# Patient Record
Sex: FEMALE | Race: WHITE | Employment: UNEMPLOYED | URBAN - METROPOLITAN AREA
[De-identification: names, ages, dates, MRNs, and addresses within clinical notes are randomized per-mention and may not be internally consistent; named-entity substitution may affect disease eponyms.]

---

## 2018-11-09 ENCOUNTER — HOSPITAL ENCOUNTER (OUTPATIENT)
Dept: LAB | Age: 38
Discharge: HOME | End: 2018-11-09

## 2018-11-09 LAB
HCT VFR BLD AUTO: 37.1 % (ref 35–45)
HGB BLD-MCNC: 12.4 G/DL (ref 12–16)

## 2018-11-09 PROCEDURE — 36415 COLL VENOUS BLD VENIPUNCTURE: CPT

## 2018-11-09 PROCEDURE — 85018 HEMOGLOBIN: CPT

## 2018-11-11 ENCOUNTER — HOSPITAL ENCOUNTER (EMERGENCY)
Age: 38
Discharge: STILL ADMITTED OR ADMINISTRATIVE | DRG: 228 | End: 2018-11-11
Attending: EMERGENCY MEDICINE
Payer: MEDICAID

## 2018-11-11 ENCOUNTER — HOSPITAL ENCOUNTER (INPATIENT)
Age: 38
LOS: 3 days | Discharge: HOME | DRG: 228 | End: 2018-11-14
Attending: SURGERY | Admitting: SURGERY
Payer: MEDICAID

## 2018-11-11 VITALS
HEART RATE: 78 BPM | HEIGHT: 64 IN | OXYGEN SATURATION: 100 % | DIASTOLIC BLOOD PRESSURE: 73 MMHG | WEIGHT: 124 LBS | SYSTOLIC BLOOD PRESSURE: 99 MMHG | TEMPERATURE: 98.5 F | BODY MASS INDEX: 21.17 KG/M2 | RESPIRATION RATE: 18 BRPM

## 2018-11-11 DIAGNOSIS — K40.30 INCARCERATED INGUINAL HERNIA: Primary | ICD-10-CM

## 2018-11-11 LAB
ALBUMIN SERPL-MCNC: 3.9 G/DL (ref 3.4–5)
ALBUMIN/GLOB SERPL: 1.2 {RATIO} (ref 0.8–1.7)
ALP SERPL-CCNC: 50 U/L (ref 45–117)
ALT SERPL-CCNC: 16 U/L (ref 13–56)
ANION GAP SERPL CALC-SCNC: 9 MMOL/L (ref 3–18)
APPEARANCE UR: NORMAL
AST SERPL-CCNC: 10 U/L (ref 15–37)
BASOPHILS # BLD: 0 K/UL (ref 0–0.1)
BASOPHILS NFR BLD: 0 % (ref 0–2)
BILIRUB SERPL-MCNC: 0.2 MG/DL (ref 0.2–1)
BILIRUB UR QL: NEGATIVE
BUN SERPL-MCNC: 14 MG/DL (ref 7–18)
BUN/CREAT SERPL: 19
CALCIUM SERPL-MCNC: 8.3 MG/DL (ref 8.5–10.1)
CHLORIDE SERPL-SCNC: 111 MMOL/L (ref 100–108)
CO2 SERPL-SCNC: 23 MMOL/L (ref 21–32)
COLOR UR: YELLOW
CREAT SERPL-MCNC: 0.74 MG/DL (ref 0.6–1.3)
DIFFERENTIAL METHOD BLD: ABNORMAL
EOSINOPHIL # BLD: 0.2 K/UL (ref 0–0.4)
EOSINOPHIL NFR BLD: 2 % (ref 0–5)
ERYTHROCYTE [DISTWIDTH] IN BLOOD BY AUTOMATED COUNT: 13 % (ref 11.6–14.5)
GLOBULIN SER CALC-MCNC: 3.3 G/DL (ref 2–4)
GLUCOSE SERPL-MCNC: 82 MG/DL (ref 74–99)
GLUCOSE UR STRIP.AUTO-MCNC: NEGATIVE MG/DL
HCT VFR BLD AUTO: 38.2 % (ref 35–45)
HGB BLD-MCNC: 13 G/DL (ref 12–16)
HGB UR QL STRIP: NEGATIVE
KETONES UR QL STRIP.AUTO: NEGATIVE MG/DL
LEUKOCYTE ESTERASE UR QL STRIP.AUTO: NEGATIVE
LYMPHOCYTES # BLD: 3 K/UL (ref 0.9–3.6)
LYMPHOCYTES NFR BLD: 37 % (ref 21–52)
MCH RBC QN AUTO: 30.9 PG (ref 24–34)
MCHC RBC AUTO-ENTMCNC: 34 G/DL (ref 31–37)
MCV RBC AUTO: 90.7 FL (ref 74–97)
MONOCYTES # BLD: 0.5 K/UL (ref 0.05–1.2)
MONOCYTES NFR BLD: 6 % (ref 3–10)
NEUTS SEG # BLD: 4.5 K/UL (ref 1.8–8)
NEUTS SEG NFR BLD: 55 % (ref 40–73)
NITRITE UR QL STRIP.AUTO: NEGATIVE
PH UR STRIP: 7.5 [PH] (ref 5–8)
PLATELET # BLD AUTO: 191 K/UL (ref 135–420)
PMV BLD AUTO: 9 FL (ref 9.2–11.8)
POTASSIUM SERPL-SCNC: 3.7 MMOL/L (ref 3.5–5.5)
PROT SERPL-MCNC: 7.2 G/DL (ref 6.4–8.2)
PROT UR STRIP-MCNC: NEGATIVE MG/DL
RBC # BLD AUTO: 4.21 M/UL (ref 4.2–5.3)
SODIUM SERPL-SCNC: 143 MMOL/L (ref 136–145)
SP GR UR REFRACTOMETRY: 1.02 (ref 1–1.03)
UROBILINOGEN UR QL STRIP.AUTO: 1 EU/DL (ref 0.2–1)
WBC # BLD AUTO: 8.2 K/UL (ref 4.6–13.2)

## 2018-11-11 PROCEDURE — 81003 URINALYSIS AUTO W/O SCOPE: CPT

## 2018-11-11 PROCEDURE — 80053 COMPREHEN METABOLIC PANEL: CPT

## 2018-11-11 PROCEDURE — 85025 COMPLETE CBC W/AUTO DIFF WBC: CPT

## 2018-11-11 PROCEDURE — 65270000029 HC RM PRIVATE

## 2018-11-11 PROCEDURE — 99283 EMERGENCY DEPT VISIT LOW MDM: CPT

## 2018-11-11 PROCEDURE — 74011000258 HC RX REV CODE- 258: Performed by: SURGERY

## 2018-11-11 PROCEDURE — 74011250636 HC RX REV CODE- 250/636: Performed by: SURGERY

## 2018-11-11 RX ORDER — OXYCODONE AND ACETAMINOPHEN 5; 325 MG/1; MG/1
1 TABLET ORAL
Status: DISCONTINUED | OUTPATIENT
Start: 2018-11-11 | End: 2018-11-13

## 2018-11-11 RX ORDER — SODIUM CHLORIDE 0.9 % (FLUSH) 0.9 %
5-10 SYRINGE (ML) INJECTION EVERY 8 HOURS
Status: DISCONTINUED | OUTPATIENT
Start: 2018-11-11 | End: 2018-11-14 | Stop reason: HOSPADM

## 2018-11-11 RX ORDER — DEXTROSE MONOHYDRATE AND SODIUM CHLORIDE 5; .45 G/100ML; G/100ML
150 INJECTION, SOLUTION INTRAVENOUS CONTINUOUS
Status: DISCONTINUED | OUTPATIENT
Start: 2018-11-11 | End: 2018-11-12

## 2018-11-11 RX ORDER — SODIUM CHLORIDE 0.9 % (FLUSH) 0.9 %
5-10 SYRINGE (ML) INJECTION AS NEEDED
Status: DISCONTINUED | OUTPATIENT
Start: 2018-11-11 | End: 2018-11-14 | Stop reason: HOSPADM

## 2018-11-11 RX ORDER — HYDROMORPHONE HYDROCHLORIDE 2 MG/ML
1 INJECTION, SOLUTION INTRAMUSCULAR; INTRAVENOUS; SUBCUTANEOUS
Status: DISCONTINUED | OUTPATIENT
Start: 2018-11-11 | End: 2018-11-13

## 2018-11-11 RX ORDER — KETOROLAC TROMETHAMINE 30 MG/ML
30 INJECTION, SOLUTION INTRAMUSCULAR; INTRAVENOUS
Status: DISPENSED | OUTPATIENT
Start: 2018-11-11 | End: 2018-11-12

## 2018-11-11 RX ORDER — TOPIRAMATE 100 MG/1
200 TABLET, FILM COATED ORAL 2 TIMES DAILY
Status: DISCONTINUED | OUTPATIENT
Start: 2018-11-12 | End: 2018-11-14 | Stop reason: HOSPADM

## 2018-11-11 RX ORDER — ONDANSETRON 2 MG/ML
4 INJECTION INTRAMUSCULAR; INTRAVENOUS
Status: DISCONTINUED | OUTPATIENT
Start: 2018-11-11 | End: 2018-11-14 | Stop reason: HOSPADM

## 2018-11-11 RX ADMIN — DEXTROSE MONOHYDRATE AND SODIUM CHLORIDE 125 ML/HR: 5; .45 INJECTION, SOLUTION INTRAVENOUS at 23:04

## 2018-11-11 RX ADMIN — ONDANSETRON 4 MG: 2 INJECTION INTRAMUSCULAR; INTRAVENOUS at 22:35

## 2018-11-11 RX ADMIN — HYDROMORPHONE HYDROCHLORIDE 1 MG: 2 INJECTION INTRAMUSCULAR; INTRAVENOUS; SUBCUTANEOUS at 22:35

## 2018-11-11 RX ADMIN — Medication 10 ML: at 23:04

## 2018-11-12 ENCOUNTER — APPOINTMENT (OUTPATIENT)
Dept: CT IMAGING | Age: 38
DRG: 228 | End: 2018-11-12
Attending: SURGERY
Payer: MEDICAID

## 2018-11-12 LAB
ANION GAP SERPL CALC-SCNC: 7 MMOL/L (ref 3–18)
BASOPHILS # BLD: 0 K/UL (ref 0–0.1)
BASOPHILS NFR BLD: 0 % (ref 0–2)
BUN SERPL-MCNC: 10 MG/DL (ref 7–18)
BUN/CREAT SERPL: 14
CALCIUM SERPL-MCNC: 7.3 MG/DL (ref 8.5–10.1)
CHLORIDE SERPL-SCNC: 111 MMOL/L (ref 100–108)
CO2 SERPL-SCNC: 22 MMOL/L (ref 21–32)
CREAT SERPL-MCNC: 0.72 MG/DL (ref 0.6–1.3)
D DIMER PPP FEU-MCNC: <0.27 UG/ML(FEU)
DIFFERENTIAL METHOD BLD: ABNORMAL
EOSINOPHIL # BLD: 0.1 K/UL (ref 0–0.4)
EOSINOPHIL NFR BLD: 2 % (ref 0–5)
ERYTHROCYTE [DISTWIDTH] IN BLOOD BY AUTOMATED COUNT: 13.3 % (ref 11.6–14.5)
GLUCOSE SERPL-MCNC: 74 MG/DL (ref 74–99)
HCT VFR BLD AUTO: 36 % (ref 35–45)
HGB BLD-MCNC: 11.8 G/DL (ref 12–16)
LACTATE SERPL-SCNC: 0.5 MMOL/L (ref 0.4–2)
LYMPHOCYTES # BLD: 2.5 K/UL (ref 0.9–3.6)
LYMPHOCYTES NFR BLD: 51 % (ref 21–52)
MCH RBC QN AUTO: 30.2 PG (ref 24–34)
MCHC RBC AUTO-ENTMCNC: 32.8 G/DL (ref 31–37)
MCV RBC AUTO: 92.1 FL (ref 74–97)
MONOCYTES # BLD: 0.4 K/UL (ref 0.05–1.2)
MONOCYTES NFR BLD: 8 % (ref 3–10)
NEUTS SEG # BLD: 2 K/UL (ref 1.8–8)
NEUTS SEG NFR BLD: 39 % (ref 40–73)
PLATELET # BLD AUTO: 178 K/UL (ref 135–420)
PMV BLD AUTO: 9.2 FL (ref 9.2–11.8)
POTASSIUM SERPL-SCNC: 3.6 MMOL/L (ref 3.5–5.5)
RBC # BLD AUTO: 3.91 M/UL (ref 4.2–5.3)
SODIUM SERPL-SCNC: 140 MMOL/L (ref 136–145)
WBC # BLD AUTO: 5 K/UL (ref 4.6–13.2)

## 2018-11-12 PROCEDURE — 65270000029 HC RM PRIVATE

## 2018-11-12 PROCEDURE — 74011000258 HC RX REV CODE- 258: Performed by: SURGERY

## 2018-11-12 PROCEDURE — 74011250636 HC RX REV CODE- 250/636: Performed by: HOSPITALIST

## 2018-11-12 PROCEDURE — 74177 CT ABD & PELVIS W/CONTRAST: CPT

## 2018-11-12 PROCEDURE — 85025 COMPLETE CBC W/AUTO DIFF WBC: CPT

## 2018-11-12 PROCEDURE — 74011250636 HC RX REV CODE- 250/636: Performed by: SURGERY

## 2018-11-12 PROCEDURE — 36415 COLL VENOUS BLD VENIPUNCTURE: CPT

## 2018-11-12 PROCEDURE — 80048 BASIC METABOLIC PNL TOTAL CA: CPT

## 2018-11-12 PROCEDURE — 74011250636 HC RX REV CODE- 250/636: Performed by: PHYSICIAN ASSISTANT

## 2018-11-12 PROCEDURE — 74011250636 HC RX REV CODE- 250/636: Performed by: INTERNAL MEDICINE

## 2018-11-12 PROCEDURE — 85379 FIBRIN DEGRADATION QUANT: CPT

## 2018-11-12 PROCEDURE — 83605 ASSAY OF LACTIC ACID: CPT

## 2018-11-12 PROCEDURE — 74011250637 HC RX REV CODE- 250/637: Performed by: SURGERY

## 2018-11-12 PROCEDURE — 74011636320 HC RX REV CODE- 636/320: Performed by: SURGERY

## 2018-11-12 RX ORDER — SODIUM CHLORIDE 9 MG/ML
150 INJECTION, SOLUTION INTRAVENOUS CONTINUOUS
Status: DISCONTINUED | OUTPATIENT
Start: 2018-11-12 | End: 2018-11-13

## 2018-11-12 RX ADMIN — SODIUM CHLORIDE 500 ML: 900 INJECTION, SOLUTION INTRAVENOUS at 11:54

## 2018-11-12 RX ADMIN — HYDROMORPHONE HYDROCHLORIDE 1 MG: 2 INJECTION INTRAMUSCULAR; INTRAVENOUS; SUBCUTANEOUS at 02:51

## 2018-11-12 RX ADMIN — IOPAMIDOL 100 ML: 612 INJECTION, SOLUTION INTRAVENOUS at 02:38

## 2018-11-12 RX ADMIN — SODIUM CHLORIDE 1000 ML: 900 INJECTION, SOLUTION INTRAVENOUS at 20:28

## 2018-11-12 RX ADMIN — OXYCODONE HYDROCHLORIDE AND ACETAMINOPHEN 1 TABLET: 5; 325 TABLET ORAL at 16:40

## 2018-11-12 RX ADMIN — SODIUM CHLORIDE 1000 ML: 900 INJECTION, SOLUTION INTRAVENOUS at 13:27

## 2018-11-12 RX ADMIN — TOPIRAMATE 200 MG: 100 TABLET, FILM COATED ORAL at 09:10

## 2018-11-12 RX ADMIN — TOPIRAMATE 200 MG: 100 TABLET, FILM COATED ORAL at 20:28

## 2018-11-12 RX ADMIN — Medication 10 ML: at 07:36

## 2018-11-12 RX ADMIN — KETOROLAC TROMETHAMINE 30 MG: 30 INJECTION, SOLUTION INTRAMUSCULAR at 13:44

## 2018-11-12 RX ADMIN — SODIUM CHLORIDE 150 ML/HR: 900 INJECTION, SOLUTION INTRAVENOUS at 23:59

## 2018-11-12 RX ADMIN — OXYCODONE HYDROCHLORIDE AND ACETAMINOPHEN 1 TABLET: 5; 325 TABLET ORAL at 00:34

## 2018-11-12 RX ADMIN — SODIUM CHLORIDE 500 ML: 900 INJECTION, SOLUTION INTRAVENOUS at 23:59

## 2018-11-12 RX ADMIN — ONDANSETRON 4 MG: 2 INJECTION INTRAMUSCULAR; INTRAVENOUS at 07:36

## 2018-11-12 RX ADMIN — SODIUM CHLORIDE 500 ML: 900 INJECTION, SOLUTION INTRAVENOUS at 22:49

## 2018-11-12 RX ADMIN — SODIUM CHLORIDE 150 ML/HR: 900 INJECTION, SOLUTION INTRAVENOUS at 16:44

## 2018-11-12 RX ADMIN — DEXTROSE MONOHYDRATE AND SODIUM CHLORIDE 125 ML/HR: 5; .45 INJECTION, SOLUTION INTRAVENOUS at 07:36

## 2018-11-12 RX ADMIN — ONDANSETRON 4 MG: 2 INJECTION INTRAMUSCULAR; INTRAVENOUS at 02:52

## 2018-11-12 RX ADMIN — TOPIRAMATE 200 MG: 100 TABLET, FILM COATED ORAL at 00:00

## 2018-11-12 RX ADMIN — KETOROLAC TROMETHAMINE 30 MG: 30 INJECTION, SOLUTION INTRAMUSCULAR at 20:36

## 2018-11-12 RX ADMIN — HYDROMORPHONE HYDROCHLORIDE 1 MG: 2 INJECTION INTRAMUSCULAR; INTRAVENOUS; SUBCUTANEOUS at 07:36

## 2018-11-12 RX ADMIN — Medication 10 ML: at 13:27

## 2018-11-12 RX ADMIN — PROMETHAZINE HYDROCHLORIDE 25 MG: 25 INJECTION, SOLUTION INTRAMUSCULAR; INTRAVENOUS at 00:34

## 2018-11-12 NOTE — ED NOTES
Pt aware of probably will need urine sample for lab. \"Why, I already did all that on Friday. They did blood too. I can't go right now. \"  Urine cup to bedside.

## 2018-11-12 NOTE — PROGRESS NOTES
Called by nurse with persistent low BP. HR normal.  Says she normally has a low BP. No complaints except for same L groin pain. Given IVF and bolus. Will ask hospitalist to see. Discussed with Dr. Lc Shannon. Will cancel surgery for today.

## 2018-11-12 NOTE — CONSULTS
Medicine Consult    Patient:  Endy Manzanares 43 y.o. female  Asked to evaluate patient by Dr Cherrie Hamm  Primary Care Provider:  Shiv Dupree MD  Date of Admission:  11/11/2018  Reason for Consult: Hypotension        Assessment/Plan     Patient Active Problem List   Diagnosis Code    Hematuria, microscopic R31.29    Acquired absence of both cervix and uterus Z90.710    Hemangioma D18.00    History of anemia Z86.2    History of asthma Z87.09    Hyperlipidemia E78.5    Migraine G43.909    Migraine headache G43.909    Mixed anxiety depressive disorder F41.8    Rib pain R07.81    Seizure ([address] R56.9    Incarcerated inguinal hernia K40.30       PLAN:    1. Inguinal Hernia  2. Hypotension  3. Migraines    1. Patient was supposed to go for surgery this afternoon, however since patient is hypotensive, per Dr Cherrie Hamm will cancel the surgery today. Will change diet to regular. Further surgical plans per Dr Cherrie Hamm. 2. Patient hypotensive after multiple BP readings this AM. Was given 500cc bolus over 60min without improvement. Will increase maintenance fluid rate to 150ml/hr, and give a second bolus of 1000ml over 60min. Will obtain stat CBC and BMP, as well as D-Dimer. Will continue to monitor closely. Only symptoms patient is reporting is lightheadedness when transitioning to standing. 3. Patient states she is taking Fioricet at home. Now that NPO status has been canceled she may have her usual medications. Judicious use of pain medicine as patient is already hypotensive. Thank you for allowing us to participate in this patients care. HPI:   CC: Endy Manzanares is a 43 y.o. female with past medical history significant for asthma, chronic pain, hemangioma with AVM formation presents today as a consult from Dr Cherrei Hamm for hypotension. The patient has been having issues with an inguinal hernia for the last two months, and was admitted to the hospital for surgical intervention.  She states that in the past, prior to surgery, she has been told she has had low blood pressure and attributes it to not eating/drinking and lying in bed in prep for surgery. She only reports lightheadedness when transitioning to stand. Otherwise she denies SOB, CP, N/V/D. Past Medical History:   Diagnosis Date    Asthma     Back pain     Hematuria, microscopic 9/19/2016    Kenny Woodard is a  [facility] y.o[facility] female who presents today for mod Microscopic hematuria In smoker ,  Chronic pelvic pain  , neg GYN w/u  Cytology neg   9/16  CT urogram  9/16  NUTS     Other ill-defined conditions(799.89)     emphysema    Other ill-defined conditions(799.89)     hemangioma    Sciatica     Smoker      Past Surgical History:   Procedure Laterality Date    HX APPENDECTOMY      HX CARPAL TUNNEL RELEASE      HX CYST REMOVAL      HX GYN      2 ectopic pregnancies    HX HYSTERECTOMY        Social History     Tobacco Use    Smoking status: Current Every Day Smoker     Years: 1.00     Types: Cigarettes    Smokeless tobacco: Never Used   Substance Use Topics    Alcohol use: Yes     Comment: occassionally    Drug use: No     No family history on file. No current facility-administered medications on file prior to encounter. Current Outpatient Medications on File Prior to Encounter   Medication Sig Dispense Refill    ALPRAZolam (XANAX) 0.5 mg tablet TAKE 1 TABLET BY MOUTH DAILY AS NEEDED FOR ANXIETY  5    topiramate (TOPAMAX) 200 mg tablet TAKE 1 TABLET BY ORAL ROUTE 2 TIMES EVERY DAY  5    butalbital-acetaminophen-caffeine (FIORICET, ESGIC) -40 mg per tablet Take 1 Tab by Mouth Every 4 Hours As Needed.  ibuprofen (MOTRIN) 600 mg tablet 600 mg.      FLUoxetine (PROZAC) 40 mg capsule Take 40 mg by mouth.       VENTOLIN HFA 90 mcg/actuation inhaler INHALE 2 PUFFS BY MOUTH EVERY 4-6 HOURS AS NEEDED FOR CHEST TIGHTNESS,COUGH,WHEEZE OR ASTHMA  3    ziprasidone (GEODON) 40 mg capsule TAKE ONE CAPSULE BY MOUTH TWICE A DAY WITH FOOD  5 Allergies   Allergen Reactions    Latex Hives    Sumatriptan Other (comments)     Pt states \"it made my headache so much worse\" Asked by Dr Chitra Fletcher to add it to her allergy list    Imitrex [Sumatriptan Succinate] Other (comments)    Lamictal [Lamotrigine] Other (comments)    Zoloft [Sertraline] Other (comments)           Review of Systems  Constitutional: No fever, chills, diaphoresis, malaise, fatigue or weight gain/loss or falls  Skin: no itching or rashes  HEENT: no ear discomfort, hearing loss, tinnitus, epistaxis or sore throat  EYES: no blurry vision, double vision or photophobia  CARDIOVASCULAR: no claudication, cp, palpitations, orthopnea, pnd or LE edema  PULMONARY: no cough, wheeze, shortness of breath or sputum production  GI: no nausea, vomiting, diarrhea, +abdominal pain, -melena, -hematemesis or brbpr  : no dysuria, hematuria  MUSCULOSKELETAL: no back pain, joint pain or myalgias  ENDOCRINE: no heat/cold intolerance, polyuria or polydipsia  HEME: no easy bruising or bleeding  NEURO: no unilateral weakness, numbness, tingling or seizures      Physical Exam:      Visit Vitals  BP (!) 78/50 (BP 1 Location: Right arm, BP Patient Position: At rest)   Pulse 72   Temp 97.9 °F (36.6 °C)   Resp 16   SpO2 100%     There is no height or weight on file to calculate BMI.     Physical Exam:  GEN: well nourished, laying in bed in no acute distress  HEENT: atraumatic, nose normal,oropharynx clear, MMM  NECK: supple, trachea midline, no supraclavicular or submandibular adenopathy noted  EYES: conjuctiva normal, lids with out lesions, PERRL  HEART: RRR with out m/r/g, pmi nondisplaced, pulses 2+ distally  LUNGS: equal chest wall expansion, cta bl with out wheezes/rales or rhonchi  AB: soft, +BS, nt/nd no organomegaly  NEURO: alert, awake and oriented x3, gait not assessed, cranial nerves intact, strength 5/5 bl UE and LE, sensation intact, reflexes nonpathological  SKIN: dry, intact, warm no breakdown noted        Laboratory Studies:    Recent Results (from the past 24 hour(s))   METABOLIC PANEL, COMPREHENSIVE    Collection Time: 11/11/18  8:30 PM   Result Value Ref Range    Sodium 143 136 - 145 mmol/L    Potassium 3.7 3.5 - 5.5 mmol/L    Chloride 111 (H) 100 - 108 mmol/L    CO2 23 21 - 32 mmol/L    Anion gap 9 3.0 - 18 mmol/L    Glucose 82 74 - 99 mg/dL    BUN 14 7.0 - 18 MG/DL    Creatinine 0.74 0.6 - 1.3 MG/DL    BUN/Creatinine ratio 19      GFR est AA >60 >60 ml/min/1.73m2    GFR est non-AA >60 >60 ml/min/1.73m2    Calcium 8.3 (L) 8.5 - 10.1 MG/DL    Bilirubin, total 0.2 0.2 - 1.0 MG/DL    ALT (SGPT) 16 13 - 56 U/L    AST (SGOT) 10 (L) 15 - 37 U/L    Alk. phosphatase 50 45 - 117 U/L    Protein, total 7.2 6.4 - 8.2 g/dL    Albumin 3.9 3.4 - 5.0 g/dL    Globulin 3.3 2.0 - 4.0 g/dL    A-G Ratio 1.2 0.8 - 1.7     CBC WITH AUTOMATED DIFF    Collection Time: 11/11/18  8:30 PM   Result Value Ref Range    WBC 8.2 4.6 - 13.2 K/uL    RBC 4.21 4.20 - 5.30 M/uL    HGB 13.0 12.0 - 16.0 g/dL    HCT 38.2 35.0 - 45.0 %    MCV 90.7 74.0 - 97.0 FL    MCH 30.9 24.0 - 34.0 PG    MCHC 34.0 31.0 - 37.0 g/dL    RDW 13.0 11.6 - 14.5 %    PLATELET 631 969 - 944 K/uL    MPV 9.0 (L) 9.2 - 11.8 FL    NEUTROPHILS 55 40 - 73 %    LYMPHOCYTES 37 21 - 52 %    MONOCYTES 6 3 - 10 %    EOSINOPHILS 2 0 - 5 %    BASOPHILS 0 0 - 2 %    ABS. NEUTROPHILS 4.5 1.8 - 8.0 K/UL    ABS. LYMPHOCYTES 3.0 0.9 - 3.6 K/UL    ABS. MONOCYTES 0.5 0.05 - 1.2 K/UL    ABS. EOSINOPHILS 0.2 0.0 - 0.4 K/UL    ABS.  BASOPHILS 0.0 0.0 - 0.1 K/UL    DF AUTOMATED     URINALYSIS W/ RFLX MICROSCOPIC    Collection Time: 11/11/18  8:30 PM   Result Value Ref Range    Color YELLOW      Appearance TURBID      Specific gravity 1.020 1.005 - 1.030      pH (UA) 7.5 5.0 - 8.0      Protein NEGATIVE  NEG mg/dL    Glucose NEGATIVE  NEG mg/dL    Ketone NEGATIVE  NEG mg/dL    Bilirubin NEGATIVE  NEG      Blood NEGATIVE  NEG      Urobilinogen 1.0 0.2 - 1.0 EU/dL    Nitrites NEGATIVE  NEG Leukocyte Esterase NEGATIVE  NEG

## 2018-11-12 NOTE — H&P
[facility] HISTORY AND PHYSICAL Steven Salcido 
MR#: [id number] : 1980 ACCOUNT #: [de-identified] ADMIT DATE: 2018 HISTORY OF PRESENT ILLNESS:  The patient is a 39-year-old patient who presents with left groin pain. She was seen in the office late last week with left groin pain and a reported bulge in her left groin. On examination, she has a slight left inguinal hernia and the patient also reported a bulge to be present when she strains. The area has become quite tender. She was to undergo outpatient CT scan. However, her pain increased last night and was directly admitted. CT scan shows no abnormality. On examination, she is tender in the left groin. There is a small left inguinal hernia. PAST MEDICAL HISTORY:  Significant for GYN issues in the past, migraines and what could be categorized as some chronic pain. She has a history of asthma, back pain. PAST SURGICAL HISTORY:  Appendectomy in the past, GYN surgery, hysterectomy and cyst removal. 
 
SOCIAL HISTORY:  She is a smoker. FAMILY HISTORY:  Noncontributory. REVIEW OF SYSTEMS:  As above. PHYSICAL EXAMINATION: 
VITAL SIGNS:  Stable. HEENT:  Unremarkable. LUNGS:  Clear to auscultation bilaterally. HEART:  Regular rate and rhythm. No murmurs, gallops or rubs. ABDOMEN:  Tender in the left groin with a small left inguinal hernia, which is reducible. No hepatosplenomegaly. EXTREMITIES:  No clubbing, cyanosis or edema. Pulses +2 bilaterally. SKIN:  She has a hemangioma over her left cheek. NEUROLOGIC:  Grossly intact. PSYCHIATRIC:  Normal mood and affect. ASSESSMENT AND PLAN:  This a patient who presents with a symptomatic left inguinal hernia. It does not appear to be incarcerated on examination. I discussed the plan with the patient and have recommended repair of her left inguinal hernia.   Given her increased pain levels, she is at high risk for chronic pain postoperatively and I have informed her of this. Because of this, I recommended that she undergo triple neurectomy. I also plan on using mesh if needed. She understands this. She understands the risks, benefits and alternatives including the use of mesh, nerve resection, chronic pain and recurrence. The procedure will be done in an open fashion because of the nerve pain and need for neurectomy. MD DOROTEO [place] 
D: 11/12/2018 08:20 T: 11/12/2018 08:33 
JOB #: [id number]

## 2018-11-12 NOTE — ROUTINE PROCESS
Bedside and Verbal shift change report given to LUIS Noguera RN (oncoming nurse) by PEDRO Dumont RN (offgoing nurse). Report included the following information SBAR, Kardex, Intake/Output and MAR.

## 2018-11-12 NOTE — PROGRESS NOTES
0750-received report from [address] [place] [address] included SBAR MAR and Kardex. Shift summary-Dr Khan notified of pt low blood pressure 70's/40's. 500mL NS bolus given with little effect. Consult to Hospitalist 1000mL bolusNS with minimal effect. IV fluids changed to NS. Narcotic pain meds held due to BP. Pt to be NPO at 6AM, surgery scheduled for 1PM tomorrow. Pt refused to allow placement of cardiac monitor. Bedside and Verbal shift change report given to Kathi Mac RN (oncoming nurse) by Mari Zimmer RN (offgoing nurse). Report included the following information SBAR, Kardex and MAR.

## 2018-11-12 NOTE — ED TRIAGE NOTES
Pt presents to ED holding her LT side of abd.  LT abd pain that radiates down into her LT pelvic x 2 months. Some nausea/no vomiting. She was told on this past Thursday by Dr Debby Simeon to have a hernia.

## 2018-11-12 NOTE — ED NOTES
This RN spoke with [address]  Supervisor who has instructed this RN to drop pt from the ED track board so pt can be directly admitted to [address] [place] [address]

## 2018-11-12 NOTE — PROGRESS NOTES
Shift summary: Pt A&Ox4, up ad korey, pain managed with IV Dilaudid and PO percocet. Pt down to CT for stat CT scan. Appears to be resting comfortably.

## 2018-11-13 ENCOUNTER — ANESTHESIA EVENT (OUTPATIENT)
Dept: SURGERY | Age: 38
DRG: 228 | End: 2018-11-13
Payer: MEDICAID

## 2018-11-13 ENCOUNTER — ANESTHESIA (OUTPATIENT)
Dept: SURGERY | Age: 38
DRG: 228 | End: 2018-11-13
Payer: MEDICAID

## 2018-11-13 PROCEDURE — 74011250636 HC RX REV CODE- 250/636: Performed by: SURGERY

## 2018-11-13 PROCEDURE — 76010000138 HC OR TIME 0.5 TO 1 HR: Performed by: SURGERY

## 2018-11-13 PROCEDURE — 0YU60JZ SUPPLEMENT LEFT INGUINAL REGION WITH SYNTHETIC SUBSTITUTE, OPEN APPROACH: ICD-10-PCS | Performed by: SURGERY

## 2018-11-13 PROCEDURE — 74011250636 HC RX REV CODE- 250/636

## 2018-11-13 PROCEDURE — 74011250636 HC RX REV CODE- 250/636: Performed by: HOSPITALIST

## 2018-11-13 PROCEDURE — 01BM0ZZ EXCISION OF ABDOMINAL SYMPATHETIC NERVE, OPEN APPROACH: ICD-10-PCS | Performed by: SURGERY

## 2018-11-13 PROCEDURE — 77030039266 HC ADH SKN EXOFIN S2SG -A: Performed by: SURGERY

## 2018-11-13 PROCEDURE — 74011000250 HC RX REV CODE- 250: Performed by: SURGERY

## 2018-11-13 PROCEDURE — 76210000063 HC OR PH I REC FIRST 0.5 HR: Performed by: SURGERY

## 2018-11-13 PROCEDURE — 77030012422 HC DRN WND COVD -A: Performed by: SURGERY

## 2018-11-13 PROCEDURE — 77030012508 HC MSK AIRWY LMA AMBU -A: Performed by: ANESTHESIOLOGY

## 2018-11-13 PROCEDURE — 88304 TISSUE EXAM BY PATHOLOGIST: CPT

## 2018-11-13 PROCEDURE — 77030002966 HC SUT PDS J&J -A: Performed by: SURGERY

## 2018-11-13 PROCEDURE — 77010033678 HC OXYGEN DAILY

## 2018-11-13 PROCEDURE — 88305 TISSUE EXAM BY PATHOLOGIST: CPT

## 2018-11-13 PROCEDURE — 77030002933 HC SUT MCRYL J&J -A: Performed by: SURGERY

## 2018-11-13 PROCEDURE — 77030031139 HC SUT VCRL2 J&J -A: Performed by: SURGERY

## 2018-11-13 PROCEDURE — 74011250636 HC RX REV CODE- 250/636: Performed by: ANESTHESIOLOGY

## 2018-11-13 PROCEDURE — 77030032490 HC SLV COMPR SCD KNE COVD -B: Performed by: SURGERY

## 2018-11-13 PROCEDURE — 77030031140 HC SUT VCRL3 J&J -A: Performed by: SURGERY

## 2018-11-13 PROCEDURE — 74011250637 HC RX REV CODE- 250/637: Performed by: SURGERY

## 2018-11-13 PROCEDURE — 76060000032 HC ANESTHESIA 0.5 TO 1 HR: Performed by: SURGERY

## 2018-11-13 PROCEDURE — 74011250636 HC RX REV CODE- 250/636: Performed by: INTERNAL MEDICINE

## 2018-11-13 PROCEDURE — 65270000029 HC RM PRIVATE

## 2018-11-13 PROCEDURE — C1781 MESH (IMPLANTABLE): HCPCS | Performed by: SURGERY

## 2018-11-13 DEVICE — MESH SURG W6XL6IN POLY POLYLACTIC ACID MFIL KNIT RECTANG: Type: IMPLANTABLE DEVICE | Site: GROIN | Status: FUNCTIONAL

## 2018-11-13 RX ORDER — HYDROMORPHONE HYDROCHLORIDE 2 MG/ML
0.5 INJECTION, SOLUTION INTRAMUSCULAR; INTRAVENOUS; SUBCUTANEOUS
Status: DISCONTINUED | OUTPATIENT
Start: 2018-11-13 | End: 2018-11-13 | Stop reason: HOSPADM

## 2018-11-13 RX ORDER — SODIUM CHLORIDE, SODIUM LACTATE, POTASSIUM CHLORIDE, CALCIUM CHLORIDE 600; 310; 30; 20 MG/100ML; MG/100ML; MG/100ML; MG/100ML
1000 INJECTION, SOLUTION INTRAVENOUS CONTINUOUS
Status: DISCONTINUED | OUTPATIENT
Start: 2018-11-13 | End: 2018-11-13

## 2018-11-13 RX ORDER — OXYCODONE AND ACETAMINOPHEN 5; 325 MG/1; MG/1
1 TABLET ORAL
Qty: 20 TAB | Refills: 0 | Status: SHIPPED | OUTPATIENT
Start: 2018-11-13 | End: 2019-03-29

## 2018-11-13 RX ORDER — NALOXONE HYDROCHLORIDE 4 MG/.1ML
SPRAY NASAL
Qty: 1 EACH | Refills: 0 | Status: SHIPPED | OUTPATIENT
Start: 2018-11-13 | End: 2019-03-29

## 2018-11-13 RX ORDER — GABAPENTIN 300 MG/1
300 CAPSULE ORAL 2 TIMES DAILY
Status: DISCONTINUED | OUTPATIENT
Start: 2018-11-13 | End: 2018-11-14 | Stop reason: HOSPADM

## 2018-11-13 RX ORDER — SODIUM CHLORIDE, SODIUM LACTATE, POTASSIUM CHLORIDE, CALCIUM CHLORIDE 600; 310; 30; 20 MG/100ML; MG/100ML; MG/100ML; MG/100ML
125 INJECTION, SOLUTION INTRAVENOUS CONTINUOUS
Status: DISCONTINUED | OUTPATIENT
Start: 2018-11-13 | End: 2018-11-13 | Stop reason: HOSPADM

## 2018-11-13 RX ORDER — ONDANSETRON 2 MG/ML
INJECTION INTRAMUSCULAR; INTRAVENOUS AS NEEDED
Status: DISCONTINUED | OUTPATIENT
Start: 2018-11-13 | End: 2018-11-13 | Stop reason: HOSPADM

## 2018-11-13 RX ORDER — PROPOFOL 10 MG/ML
INJECTION, EMULSION INTRAVENOUS AS NEEDED
Status: DISCONTINUED | OUTPATIENT
Start: 2018-11-13 | End: 2018-11-13 | Stop reason: HOSPADM

## 2018-11-13 RX ORDER — LIDOCAINE HYDROCHLORIDE 20 MG/ML
INJECTION, SOLUTION EPIDURAL; INFILTRATION; INTRACAUDAL; PERINEURAL AS NEEDED
Status: DISCONTINUED | OUTPATIENT
Start: 2018-11-13 | End: 2018-11-13 | Stop reason: HOSPADM

## 2018-11-13 RX ORDER — OXYCODONE AND ACETAMINOPHEN 5; 325 MG/1; MG/1
1 TABLET ORAL
Status: DISCONTINUED | OUTPATIENT
Start: 2018-11-13 | End: 2018-11-14 | Stop reason: HOSPADM

## 2018-11-13 RX ORDER — FENTANYL CITRATE 50 UG/ML
INJECTION, SOLUTION INTRAMUSCULAR; INTRAVENOUS AS NEEDED
Status: DISCONTINUED | OUTPATIENT
Start: 2018-11-13 | End: 2018-11-13 | Stop reason: HOSPADM

## 2018-11-13 RX ORDER — SODIUM CHLORIDE 9 MG/ML
75 INJECTION, SOLUTION INTRAVENOUS CONTINUOUS
Status: DISCONTINUED | OUTPATIENT
Start: 2018-11-13 | End: 2018-11-14

## 2018-11-13 RX ORDER — KETOROLAC TROMETHAMINE 30 MG/ML
INJECTION, SOLUTION INTRAMUSCULAR; INTRAVENOUS AS NEEDED
Status: DISCONTINUED | OUTPATIENT
Start: 2018-11-13 | End: 2018-11-13 | Stop reason: HOSPADM

## 2018-11-13 RX ORDER — DEXAMETHASONE SODIUM PHOSPHATE 4 MG/ML
INJECTION, SOLUTION INTRA-ARTICULAR; INTRALESIONAL; INTRAMUSCULAR; INTRAVENOUS; SOFT TISSUE AS NEEDED
Status: DISCONTINUED | OUTPATIENT
Start: 2018-11-13 | End: 2018-11-13 | Stop reason: HOSPADM

## 2018-11-13 RX ORDER — MIDAZOLAM HYDROCHLORIDE 1 MG/ML
INJECTION, SOLUTION INTRAMUSCULAR; INTRAVENOUS AS NEEDED
Status: DISCONTINUED | OUTPATIENT
Start: 2018-11-13 | End: 2018-11-13 | Stop reason: HOSPADM

## 2018-11-13 RX ORDER — KETOROLAC TROMETHAMINE 30 MG/ML
30 INJECTION, SOLUTION INTRAMUSCULAR; INTRAVENOUS
Status: DISCONTINUED | OUTPATIENT
Start: 2018-11-13 | End: 2018-11-14 | Stop reason: HOSPADM

## 2018-11-13 RX ORDER — SODIUM CHLORIDE 0.9 % (FLUSH) 0.9 %
5-10 SYRINGE (ML) INJECTION AS NEEDED
Status: DISCONTINUED | OUTPATIENT
Start: 2018-11-13 | End: 2018-11-13 | Stop reason: HOSPADM

## 2018-11-13 RX ORDER — SODIUM CHLORIDE, SODIUM LACTATE, POTASSIUM CHLORIDE, CALCIUM CHLORIDE 600; 310; 30; 20 MG/100ML; MG/100ML; MG/100ML; MG/100ML
INJECTION, SOLUTION INTRAVENOUS
Status: DISCONTINUED | OUTPATIENT
Start: 2018-11-13 | End: 2018-11-13 | Stop reason: HOSPADM

## 2018-11-13 RX ADMIN — MIDAZOLAM HYDROCHLORIDE 2 MG: 1 INJECTION, SOLUTION INTRAMUSCULAR; INTRAVENOUS at 14:21

## 2018-11-13 RX ADMIN — DEXAMETHASONE SODIUM PHOSPHATE 4 MG: 4 INJECTION, SOLUTION INTRA-ARTICULAR; INTRALESIONAL; INTRAMUSCULAR; INTRAVENOUS; SOFT TISSUE at 14:38

## 2018-11-13 RX ADMIN — GABAPENTIN 300 MG: 300 CAPSULE ORAL at 20:33

## 2018-11-13 RX ADMIN — ONDANSETRON 4 MG: 2 INJECTION INTRAMUSCULAR; INTRAVENOUS at 14:51

## 2018-11-13 RX ADMIN — OXYCODONE HYDROCHLORIDE AND ACETAMINOPHEN 1 TABLET: 5; 325 TABLET ORAL at 05:35

## 2018-11-13 RX ADMIN — SODIUM CHLORIDE, SODIUM LACTATE, POTASSIUM CHLORIDE, CALCIUM CHLORIDE: 600; 310; 30; 20 INJECTION, SOLUTION INTRAVENOUS at 14:22

## 2018-11-13 RX ADMIN — GABAPENTIN 300 MG: 300 CAPSULE ORAL at 09:22

## 2018-11-13 RX ADMIN — HYDROMORPHONE HYDROCHLORIDE 0.5 MG: 2 INJECTION INTRAMUSCULAR; INTRAVENOUS; SUBCUTANEOUS at 15:27

## 2018-11-13 RX ADMIN — TOPIRAMATE 200 MG: 100 TABLET, FILM COATED ORAL at 20:33

## 2018-11-13 RX ADMIN — FENTANYL CITRATE 25 MCG: 50 INJECTION, SOLUTION INTRAMUSCULAR; INTRAVENOUS at 14:47

## 2018-11-13 RX ADMIN — SODIUM CHLORIDE 150 ML/HR: 900 INJECTION, SOLUTION INTRAVENOUS at 05:37

## 2018-11-13 RX ADMIN — SODIUM CHLORIDE 75 ML/HR: 900 INJECTION, SOLUTION INTRAVENOUS at 20:31

## 2018-11-13 RX ADMIN — KETOROLAC TROMETHAMINE 30 MG: 30 INJECTION, SOLUTION INTRAMUSCULAR; INTRAVENOUS at 14:51

## 2018-11-13 RX ADMIN — PROMETHAZINE HYDROCHLORIDE 25 MG: 25 INJECTION, SOLUTION INTRAMUSCULAR; INTRAVENOUS at 06:53

## 2018-11-13 RX ADMIN — SODIUM CHLORIDE 1000 ML: 900 INJECTION, SOLUTION INTRAVENOUS at 00:41

## 2018-11-13 RX ADMIN — OXYCODONE HYDROCHLORIDE AND ACETAMINOPHEN 1 TABLET: 5; 325 TABLET ORAL at 17:24

## 2018-11-13 RX ADMIN — PROPOFOL 150 MG: 10 INJECTION, EMULSION INTRAVENOUS at 14:29

## 2018-11-13 RX ADMIN — LIDOCAINE HYDROCHLORIDE 40 MG: 20 INJECTION, SOLUTION EPIDURAL; INFILTRATION; INTRACAUDAL; PERINEURAL at 14:27

## 2018-11-13 RX ADMIN — FENTANYL CITRATE 50 MCG: 50 INJECTION, SOLUTION INTRAMUSCULAR; INTRAVENOUS at 14:27

## 2018-11-13 RX ADMIN — SODIUM CHLORIDE, SODIUM LACTATE, POTASSIUM CHLORIDE, AND CALCIUM CHLORIDE 1000 ML: 600; 310; 30; 20 INJECTION, SOLUTION INTRAVENOUS at 13:26

## 2018-11-13 RX ADMIN — TOPIRAMATE 200 MG: 100 TABLET, FILM COATED ORAL at 09:22

## 2018-11-13 RX ADMIN — ONDANSETRON 4 MG: 2 INJECTION INTRAMUSCULAR; INTRAVENOUS at 05:35

## 2018-11-13 RX ADMIN — KETOROLAC TROMETHAMINE 30 MG: 30 INJECTION, SOLUTION INTRAMUSCULAR at 20:33

## 2018-11-13 RX ADMIN — FENTANYL CITRATE 25 MCG: 50 INJECTION, SOLUTION INTRAMUSCULAR; INTRAVENOUS at 14:39

## 2018-11-13 NOTE — PERIOP NOTES
TRANSFER - OUT REPORT: 
 
Verbal report given to lety Marie RN(name) on [facility]  being transferred to room 323(unit) for routine post - op Report consisted of patients Situation, Background, Assessment and  
Recommendations(SBAR). Information from the following report(s) SBAR was reviewed with the receiving nurse. Intake/Output Summary (Last 24 hours) at 11/13/2018 1619 Last data filed at 11/13/2018 1535 Gross per 24 hour Intake 4497.5 ml Output  Net 4497.5 ml  
 
 
Lines:  
Peripheral IV 11/11/18 Anterior; Left Antecubital (Active) Site Assessment Clean, dry, & intact 11/13/2018  3:35 PM  
Phlebitis Assessment 0 11/13/2018  3:35 PM  
Infiltration Assessment 0 11/13/2018  3:35 PM  
Dressing Status Clean, dry, & intact 11/13/2018  3:35 PM  
Dressing Type Transparent 11/13/2018  3:35 PM  
Hub Color/Line Status Blue; Infusing 11/13/2018  3:35 PM  
Action Taken Open ports on tubing capped 11/12/2018  4:41 PM  
Alcohol Cap Used Yes 11/12/2018  4:41 PM  
  
 
Opportunity for questions and clarification was provided. Patient transported with: 
 O2 @ 2 liters

## 2018-11-13 NOTE — PROGRESS NOTES
Hospitalist Progress Note Patient: Payton Spence MRN: [id number]  CSN: [id number] YOB: 1980  Age: 43 y.o. Sex: female DOA: 11/11/2018 LOS:  LOS: 2 days Chief Complaint: 
 
Consult for Hypotension Assessment/Plan 1. Inguinal Hernia 2. Hypotension 3. Migraines 1. Per Dr Norma Higgins note, will plan for procedure today. Postoperative management from primary team.  
2. BP has been stable. She received multiple boluses yesterday and an increase in basal rate. BP is stable. She is asymptomatic. Will follow postoperatively and monitor hemodynamics. 3. Allow for migraine medications once procedure completed. Patient Active Problem List  
Diagnosis Code  Hematuria, microscopic R31.29  
 Acquired absence of both cervix and uterus Z90.710  
 Hemangioma D18.00  
 History of anemia Z86.2  History of asthma Z87.09  
 Hyperlipidemia E78.5  Migraine G43.909  Migraine headache G43.909  Mixed anxiety depressive disorder F41.8  Rib pain R07.81  
 Seizure ([address] R56.9  
 Incarcerated inguinal hernia K40.30 Subjective: 
 
Continued groin pain Review of systems: 
 
Constitutional: denies fevers, chills, myalgias Respiratory: denies SOB, cough Cardiovascular: denies chest pain, palpitations Gastrointestinal: denies nausea, vomiting, diarrhea Vital signs/Intake and Output: 
Visit Vitals BP 91/57 (BP 1 Location: Right arm, BP Patient Position: At rest) Pulse (!) 50 Temp 97.8 °F (36.6 °C) Resp 18 Wt 56.2 kg (124 lb) SpO2 98% BMI 21.28 kg/m² Current Shift:  No intake/output data recorded. Last three shifts:  11/11 1901 - 11/13 0700 In: 3797.5 [I.V.:1797.5] Out: - Exam: 
 
General: Well developed, alert, NAD, OX3 Head/Neck: NCAT, supple, No masses, No lymphadenopathy CVS:Regular rate and rhythm, no M/R/G, S1/S2 heard, no thrill Lungs:Clear to auscultation bilaterally, no wheezes, rhonchi, or rales Abdomen: Soft, Nontender, No distention, Normal Bowel sounds, No hepatomegaly Extremities: No C/C/E, pulses palpable 2+ Skin:normal texture and turgor, no rashes, no lesions Neuro:grossly normal , follows commands Psych:appropriate Labs: Results:  
   
Chemistry Recent Labs 11/12/18 
1350 11/11/18 2030 GLU 74 82  143  
K 3.6 3.7 * 111* CO2 22 23 BUN 10 14 CREA 0.72 0.74 CA 7.3* 8.3* AGAP 7 9 BUCR 14 19 AP  --  50  
TP  --  7.2 ALB  --  3.9 GLOB  --  3.3 AGRAT  --  1.2  
  
CBC w/Diff Recent Labs 11/12/18 1350 11/11/18 2030 WBC 5.0 8.2 RBC 3.91* 4.21  
HGB 11.8* 13.0 HCT 36.0 38.2  191 GRANS 39* 55  
LYMPH 51 37 EOS 2 2 Cardiac Enzymes No results for input(s): CPK, CKND1, MYLES in the last 72 hours. No lab exists for component: Hezzie Chick Coagulation No results for input(s): PTP, INR, APTT in the last 72 hours. No lab exists for component: INREXT Lipid Panel No results found for: CHOL, CHOLPOCT, CHOLX, CHLST, CHOLV, 110935, HDL, LDL, LDLC, DLDLP, 521227, VLDLC, VLDL, TGLX, TRIGL, TRIGP, TGLPOCT, CHHD, CHHDX  
BNP No results for input(s): BNPP in the last 72 hours. Liver Enzymes Recent Labs 11/11/18 2030 TP 7.2 ALB 3.9 AP 50 SGOT 10* Thyroid Studies No results found for: T4, T3U, TSH, TSHEXT Procedures/imaging: see electronic medical records for all procedures/Xrays and details which were not copied into this note but were reviewed prior to creation of Plan Camden Baron PA-C

## 2018-11-13 NOTE — ROUTINE PROCESS
Bedside shift change report given to Chioma Landers RN (oncoming nurse) by Jackie Sofia RN (offgoing nurse). Report included the following information SBAR, Kardex, MAR, Recent Results, Cardiac Rhythm Sinus Jack/NSR and Alarm Parameters .

## 2018-11-13 NOTE — ANESTHESIA PREPROCEDURE EVALUATION
Anesthetic History No history of anesthetic complications Review of Systems / Medical History Patient summary reviewed, nursing notes reviewed and pertinent labs reviewed Pulmonary Smoker Asthma Neuro/Psych  
 
seizures: well controlled Psychiatric history Cardiovascular Within defined limits Exercise tolerance: >4 METS 
  
GI/Hepatic/Renal 
Within defined limits Endo/Other Within defined limits Other Findings Physical Exam 
 
Airway Mallampati: III 
TM Distance: 4 - 6 cm Neck ROM: decreased range of motion Mouth opening: Normal 
 
 Cardiovascular Regular rate and rhythm,  S1 and S2 normal,  no murmur, click, rub, or gallop Rhythm: regular Rate: normal 
 
 
 
 Dental 
 
Dentition: Caps/crowns Pulmonary Breath sounds clear to auscultation Abdominal 
GI exam deferred Other Findings Anesthetic Plan ASA: 3, emergent Anesthesia type: general 
 
 
 
 
Induction: Intravenous Anesthetic plan and risks discussed with: Patient and Spouse

## 2018-11-13 NOTE — BRIEF OP NOTE
BRIEF OPERATIVE NOTE Date of Procedure: 11/13/2018 Preoperative Diagnosis: inguinal hernia Postoperative Diagnosis: inguinal hernia Procedure(s): HERNIA INGUINAL REPAIR Surgeon(s) and Role: 
   * Eneida Trujillo MD - Primary Surgical Assistant: none Surgical Staff: 
Circ-1: Jairo Monroe RN Scrub Tech-1: Lamberto Colbert Surg Asst-1: Mary Singh Staff: Urvashi Rivera RN Event Time In Time Out Incision Start 11/13/2018 1438 Incision Close Anesthesia: General  
Estimated Blood Loss: min Specimens:  
ID Type Source Tests Collected by Time Destination 1 : FEMORAL NERVE Preservative Groin  Eneida Trujillo MD 11/13/2018 1452 Pathology 2 : ROUND LIGAMENT Preservative Groin  Eneida Trujillo MD 11/13/2018 1452 Pathology Findings: inc hernia Complications: none Implants:  
Implant Name Type Inv. Item Serial No.  Lot No. LRB No. Used [employer]ESH POLYSTR SLF- [id number] -- PROGRIP - [id number]  MESH POLYSTR SLF- [id number] -- Layla Jay  SURGICAL [id number] Left 1 Implanted

## 2018-11-13 NOTE — PROGRESS NOTES
Pt with planned surgical intervention today. Please encourage ambulation following intervention when appropriate to assist with identifying potential transition of care needs. CM continuing to follow. Care Management Interventions PCP Verified by CM: Yes Mode of Transport at Discharge: Other (see comment)(friend) Transition of Care Consult (CM Consult): Discharge Planning Health Maintenance Reviewed: Yes Current Support Network: Lives with Spouse(friend will assist) Confirm Follow Up Transport: Self Plan discussed with Pt/Family/Caregiver: Yes Discharge Location Discharge Placement: Home with family assistance

## 2018-11-13 NOTE — WOUND CARE
Patient seen during hospital wide pressure injury prevalence. Skin intact. Patient repositions self in bed with assistance. Spoke with patient concerning pressure relieving strategies. Wound care will continue to monitor during admission.

## 2018-11-13 NOTE — PROGRESS NOTES
0730-received report from TANNA Zurita RN included SBA MAR and Kardex. TRANSFER - OUT REPORT: 
 
Verbal report given to Sridevi(name) on [facility]  being transferred to OR(unit) for ordered procedure Report consisted of patients Situation, Background, Assessment and  
Recommendations(SBAR). Information from the following report(s) SBAR, Kardex and MAR was reviewed with the receiving nurse. Lines:  
Peripheral IV 11/11/18 Anterior; Left Antecubital (Active) Site Assessment Clean, dry, & intact 11/12/2018  8:46 PM  
Phlebitis Assessment 0 11/12/2018  8:46 PM  
Infiltration Assessment 0 11/12/2018  8:46 PM  
Dressing Status Clean, dry, & intact 11/12/2018  8:46 PM  
Dressing Type Tape;Transparent 11/12/2018  8:46 PM  
Hub Color/Line Status Blue; Infusing 11/12/2018  8:46 PM  
Action Taken Open ports on tubing capped 11/12/2018  4:41 PM  
Alcohol Cap Used Yes 11/12/2018  4:41 PM  
  
 
Opportunity for questions and clarification was provided. Patient transported with: 
 [employer]. TRANSFER - OUT REPORT: 
 
Verbal report given to Sridevi FLETCHER(name) on [facility]  being transferred to OR(unit) for ordered procedure Report consisted of patients Situation, Background, Assessment and  
Recommendations(SBAR). Information from the following report(s) SBAR, Kardex and MAR was reviewed with the receiving nurse. Lines:  
Peripheral IV 11/11/18 Anterior; Left Antecubital (Active) Site Assessment Clean, dry, & intact 11/12/2018  8:46 PM  
Phlebitis Assessment 0 11/12/2018  8:46 PM  
Infiltration Assessment 0 11/12/2018  8:46 PM  
Dressing Status Clean, dry, & intact 11/12/2018  8:46 PM  
Dressing Type Tape;Transparent 11/12/2018  8:46 PM  
Hub Color/Line Status Blue; Infusing 11/12/2018  8:46 PM  
Action Taken Open ports on tubing capped 11/12/2018  4:41 PM  
Alcohol Cap Used Yes 11/12/2018  4:41 PM  
  
 
Opportunity for questions and clarification was provided. Patient transported with: 
 [employer].

## 2018-11-13 NOTE — ED PROVIDER NOTES
HPI     Past Medical History:   Diagnosis Date    Asthma     Back pain     Hematuria, microscopic 9/19/2016    Rosa Strauss is a 38 y.o. female who presents today for mod Microscopic hematuria In smoker ,  Chronic pelvic pain  , neg GYN w/u  Cytology neg   9/16  CT urogram  9/16  NUTS     Other ill-defined conditions(799.89)     emphysema    Other ill-defined conditions(799.89)     hemangioma    Sciatica     Smoker        Past Surgical History:   Procedure Laterality Date    HX APPENDECTOMY      HX CARPAL TUNNEL RELEASE      HX CYST REMOVAL      HX GYN      2 ectopic pregnancies    HX HYSTERECTOMY           History reviewed. No pertinent family history. Social History     Socioeconomic History    Marital status:      Spouse name: Not on file    Number of children: Not on file    Years of education: Not on file    Highest education level: Not on file   Social Needs    Financial resource strain: Not on file    Food insecurity - worry: Not on file    Food insecurity - inability: Not on file    Transportation needs - medical: Not on file   eigital needs - non-medical: Not on file   Occupational History    Not on file   Tobacco Use    Smoking status: Current Every Day Smoker     Years: 1.00     Types: Cigarettes    Smokeless tobacco: Never Used   Substance and Sexual Activity    Alcohol use: Yes     Comment: occassionally    Drug use: No    Sexual activity: Yes   Other Topics Concern    Not on file   Social History Narrative    ** Merged History Encounter **              ALLERGIES: Latex; Sumatriptan; Imitrex [sumatriptan succinate];  Lamictal [lamotrigine]; and Zoloft [sertraline]    Review of Systems    Vitals:    11/11/18 2011   BP: 99/73   Pulse: 78   Resp: 18   Temp: 98.5 °F (36.9 °C)   SpO2: 100%   Weight: 56.2 kg (124 lb)   Height: 5' 4\" (1.626 m)            Physical Exam     MDM       Procedures

## 2018-11-13 NOTE — INTERVAL H&P NOTE
H&P Update: 
Winter Haley was seen and examined. History and physical has been reviewed. The patient has been examined.  There have been no significant clinical changes since the completion of the originally dated History and Physical. 
 
Signed By: Mak Ray MD   
 November 13, 2018 1:57 PM

## 2018-11-13 NOTE — PROGRESS NOTES
AFVSS 
BP 70 - 90's, normal HR with no symptoms. Pain L groin unchanged, maybe less today. Reviewed CeQOL for chronicpain and she has 24 % chance of pain post op. We reviewed this. Will plan open procedure and hope to not use mesh. Rec neurectomy. Discussed risks and outcomes including recurrence and chronic pain to her and . She wants to proceed. BP stable.

## 2018-11-13 NOTE — PROGRESS NOTES
1907- As per previous shift, pt refused cardiac monitoring. 1945- Received report from Darlene Knight. Pt has low blood pressure again which MD's are aware of. Pt is asymptomatic at this time and says this is her norm. 2007- Spoke with Dr. Tyree Guajardo regarding patients blood pressure. MD ordered 1 liter bolus NOW and 500ml bolus every time patients SBP is below 90. 
2030- Pt requesint percocet but educated that percocet will drop her blood lower than it is and its not safe. Pt given toradol. Pt upset stating \"He better do this surgery tomorrow even if my blood pressures are low because I am in too much and not being medicated. \" Pt sinus norma on tele as per tele tech. 2104- Spoke with Dr. Tyree Guajardo regarding patients pain management. MD stated that she reviewed patients charge and that patient has a chronic low blood pressure. The goal is to get patient to 90 systolic and then pt may have a percocet. 65- Spoke with Dr. Tyree Guajardo regarding concerns of fluids over load since pt has recevied 2000ml of fluids over the past 2 hours. MD ordered orthostatic blood pressures and page her afterwards. MD ordered PRN boluses be d/c'd 
0020- Spoke with Dr. Tyree Guajardo who requested that smaller cuff be used and a manual blood pressure be taken. Then re-page. 2780- Paged. Dr Tyree Guajardo. MD ordered another 1L bolus. Patient Vitals for the past 12 hrs: 
 Temp Pulse Resp BP SpO2  
11/13/18 0312 97.8 °F (36.6 °C) (!) 50 18 91/57 98 % 11/13/18 0034  60  (!) 82/50   
11/13/18 0024  63  (!) 77/48   
11/12/18 2355 98.2 °F (36.8 °C) (!) 54 17 (!) 85/49 100 % 11/12/18 2233 98.2 °F (36.8 °C) (!) 54 16 (!) 84/49 100 % 11/12/18 2046    98/50   
11/12/18 1932 97.9 °F (36.6 °C) (!) 54 16 (!) 76/45 96 %

## 2018-11-13 NOTE — OP NOTES
OPERATIVE NOTE    Patient: Lilli Ordoñez MRN: [id number]  [facility]: [id number]    YOB: 1980  Age: 43 y.o. Sex: female      Indications: This is a 43y.o. year-old female who presents with left inguinal hernia. Date of Procedure: 11/13/2018     Preoperative Diagnosis: Inguinal Hernia /Neuroma    Postoperative Diagnosis: Inguinal Hernia Incarcerated/ Neuroma     Procedure: Procedure(s): HERNIA INGUINAL REPAIR/Incarcerated  Neurectomy    Surgeon(s): Surgeon(s) and Role:     * Maria Esther Lynn MD - Primary    Anesthesia: General     Procedure: She was placed in the supine position. Her abdomen and groin were prepped and draped in the usual fashion. An incision was made over the inguinal ligament with the knife and then carried down through the subcutaneous tissues with the electrocautery. The external oblique fibers were slightly divided and the patient was found to have a direct  inguinal hernia incarcerated with fat and a large lipoma. The ilioinguinal nerve, iliohypogastric and genital femoral nerves were resected using the electrocautery and proximally implanted into the muscle using a 3- vicryl, this was discussed with the patient preop. The direct hernia was reduced with the lipoma and imbricated at the internal ring using a 2-0 PDS sutur. The defect in the inguinal floor was reduced and then repaired using Progrip mesh. The mesh was fashioned, placed over the inguinal floor and allowed to south  to the pubic tubercle shelving edge of the inguinal ligament and transversalis fascia using a self-griping nature of the mesh. The round ligament was ligated with a 2-0 vicryl. A single 2-0 PDS interrupted suture was placed through the mesh into the pubic tubercle to allow placement. After adequate hemostasis was seen, a 0.25% Marcaine was injected locally. The external oblique fibers were reapproximated using 2-0 PDS continuous suture.  The subcutaneous tissues were closed in layers using 3-0 Vicryl suture. The skin was closed with 4-0 Monocryl subcuticular closure and Dermabond. The patient was transferred to the recovery room in stable condition. Estimated Blood Loss: min    Specimens:   ID Type Source Tests Collected by Time Destination   1 : FEMORAL NERVE Preservative Akikoin  Moy Barboza MD 11/13/2018 1452 Pathology   2 : ROUND LIGAMENT Preservative Akikoin  Moy Barboza MD 11/13/2018 1452 Pathology        Drains: none        Complications: None; patient tolerated the procedure well.     Micah Tai MD  11/13/2018  3:14 PM

## 2018-11-13 NOTE — PROGRESS NOTES
1637-received report from [address] included SBAR MAR andd Kardex. 1650-arrived on unit TRANSFER - IN REPORT: 
 
Verbal report received from TANNA Nix RN(name) on Gamaliel Dickens  being received from PACU(unit) for routine post - op Report consisted of patients Situation, Background, Assessment and  
Recommendations(SBAR). Information from the following report(s) SBAR, Kardex and MAR was reviewed with the receiving nurse. Opportunity for questions and clarification was provided. Assessment completed upon patients arrival to unit and care assumed. Primary Nurse Donavon Pierre and Seema Perkins RN, RN performed a dual skin assessment on this patient No impairment noted Thong score is 21. Bedside and Verbal shift change report given to Kodi Sandoval RN (oncoming nurse) by Justin Cain RN (offgoing nurse). Report included the following information SBAR, Kardex and MAR.

## 2018-11-13 NOTE — PERIOP NOTES
Patient transferred to room 323. June Munoz RN assuming care. Blood pressure 113/73, pulse (!) 53, temperature 98 °F (36.7 °C), resp. rate 16, weight 56.2 kg (124 lb), last menstrual period 10/06/2010, SpO2 98 %. Dual skin assessment completed.

## 2018-11-14 VITALS
DIASTOLIC BLOOD PRESSURE: 59 MMHG | WEIGHT: 124 LBS | SYSTOLIC BLOOD PRESSURE: 102 MMHG | RESPIRATION RATE: 16 BRPM | TEMPERATURE: 98.5 F | BODY MASS INDEX: 21.28 KG/M2 | OXYGEN SATURATION: 100 % | HEART RATE: 56 BPM

## 2018-11-14 PROCEDURE — 74011250636 HC RX REV CODE- 250/636: Performed by: SURGERY

## 2018-11-14 PROCEDURE — 74011250637 HC RX REV CODE- 250/637: Performed by: SURGERY

## 2018-11-14 RX ORDER — BISACODYL 5 MG
5 TABLET, DELAYED RELEASE (ENTERIC COATED) ORAL 2 TIMES DAILY
Qty: 20 TAB | Refills: 1 | Status: SHIPPED | OUTPATIENT
Start: 2018-11-14 | End: 2019-03-29

## 2018-11-14 RX ADMIN — TOPIRAMATE 200 MG: 100 TABLET, FILM COATED ORAL at 08:17

## 2018-11-14 RX ADMIN — OXYCODONE HYDROCHLORIDE AND ACETAMINOPHEN 1 TABLET: 5; 325 TABLET ORAL at 00:52

## 2018-11-14 RX ADMIN — OXYCODONE HYDROCHLORIDE AND ACETAMINOPHEN 1 TABLET: 5; 325 TABLET ORAL at 08:13

## 2018-11-14 RX ADMIN — ONDANSETRON 4 MG: 2 INJECTION INTRAMUSCULAR; INTRAVENOUS at 08:13

## 2018-11-14 RX ADMIN — GABAPENTIN 300 MG: 300 CAPSULE ORAL at 08:17

## 2018-11-14 NOTE — PROGRESS NOTES
1945- Pt refusing cardiac monitor. Pt upset that MD will not give script for stool softener stating that her insurance pays for everything and she doesn't have money to pay for OTC. As per offgoing nurse, he spoke with Dr. Chalino Becker who stated she will not write script. 2000- Spoke with supervisor Rose Marie regarding situation. Also asked is discharge summary was needed for D/C, which it is, but was not done. Patient med rec for D/C also not completed. Supervisor notified that pt will not be discharged tonight. Shift Summary- Ice kept on surgical sight during the night. Pt continues to have low b/p and pulse. Pt medicated for pain. Incision site CDI with no drainage. Patient Vitals for the past 12 hrs: 
 Temp Pulse Resp BP SpO2  
11/14/18 0428 98.5 °F (36.9 °C) (!) 56 16 102/59 100 % 11/13/18 2322 98.5 °F (36.9 °C) (!) 49 16 94/50 100 % 11/13/18 2031 97.5 °F (36.4 °C) (!) 52 16 92/60 98 % 11/13/18 1918 98.3 °F (36.8 °C) 60 16 96/60 98 % 11/13/18 1830 97.8 °F (36.6 °C) 64 16 103/65 97 % 11/13/18 1735 97.4 °F (36.3 °C) (!) 53 16 108/71 99 %

## 2018-11-14 NOTE — DISCHARGE INSTRUCTIONS
Hernia Repair: What to Expect at [address] are likely to have pain for the next few days. You may also feel like you have the flu, and you may have a low fever and feel tired and nauseated. This is common. You should feel better after a few days and will probably feel much better in 7 days. For several weeks you may feel twinges or pulling in the hernia repair when you move. You may have some bruising on the scrotum and along the penis. This is normal. Men will need to wear a jockstrap or briefs, not boxers, for scrotal support for several days after a groin (inguinal) hernia repair. [employer] bicycle shorts may provide good support. This care sheet gives you a general idea about how long it will take for you to recover. But each person recovers at a different pace. Follow the steps below to get better as quickly as possible. How can you care for yourself at home? Activity    · Rest when you feel tired. Getting enough sleep will help you recover.     · Try to walk each day. Start by walking a little more than you did the day before. Bit by bit, increase the amount you walk. Walking boosts blood flow and helps prevent pneumonia and constipation.     · Avoid strenuous activities, such as biking, jogging, weight lifting, or aerobic exercise, until your doctor says it is okay.     · Avoid lifting anything that would make you strain. This may include heavy grocery bags and milk containers, a heavy briefcase or backpack, cat litter or dog food bags, a vacuum , or a child.     · You may drive when you are no longer taking pain medicine and can quickly move your foot from the gas pedal to the brake. You must also be able to sit comfortably for a long period of time, even if you do not plan to go far. You might get caught in traffic.     · Most people are able to return to work within 1 to 2 weeks after surgery.     · You may shower 24 to 48 hours after surgery, if your doctor okays it.  Pat the cut (incision) dry. Do not take a bath for the first 2 weeks, or until your doctor tells you it is okay.     · Your doctor will tell you when you can have sex again. Diet    · You can eat your normal diet. If your stomach is upset, try bland, low-fat foods like plain rice, broiled chicken, toast, and yogurt.     · Drink plenty of fluids (unless your doctor tells you not to).     · You may notice that your bowel movements are not regular right after your surgery. This is common. Avoid constipation and straining with bowel movements. You may want to take a fiber supplement every day. If you have not had a bowel movement after a couple of days, ask your doctor about taking a mild laxative. Medicines    · Your doctor will tell you if and when you can restart your medicines. He or she will also give you instructions about taking any new medicines.     · If you take blood thinners, such as warfarin (Coumadin), clopidogrel (Plavix), or aspirin, be sure to talk to your doctor. He or she will tell you if and when to start taking those medicines again. Make sure that you understand exactly what your doctor wants you to do.     · Be safe with medicines. Take pain medicines exactly as directed. ? If the doctor gave you a prescription medicine for pain, take it as prescribed. ? If you are not taking a prescription pain medicine, take an over-the-counter medicine such as acetaminophen (Tylenol), ibuprofen (Advil, Motrin), or naproxen (Aleve). Read and follow all instructions on the label. ? Do not take two or more pain medicines at the same time unless the doctor told you to. Many pain medicines have acetaminophen, which is Tylenol. Too much acetaminophen (Tylenol) can be harmful.     · If your doctor prescribed antibiotics, take them as directed. Do not stop taking them just because you feel better.  You need to take the full course of antibiotics.     · If you think your pain medicine is making you sick to your stomach:  ? Take your medicine after meals (unless your doctor has told you not to). ? Ask your doctor for a different pain medicine. Incision care    · If you have strips of tape on the cut (incision) the doctor made, leave the tape on for a week or until it falls off.     · If you have staples closing the cut, you will need to visit your doctor in 1 to 2 weeks to have them removed.     · Wash the area daily with warm, soapy water and pat it dry. Follow-up care is a key part of your treatment and safety. Be sure to make and go to all appointments, and call your doctor if you are having problems. It's also a good idea to know your test results and keep a list of the medicines you take. When should you call for help? Call 911 anytime you think you may need emergency care. For example, call if:    · You passed out (lost consciousness).     · You are short of breath.    Call your doctor now or seek immediate medical care if:    · You have pain that does not get better after you take pain medicine.     · You are sick to your stomach and cannot keep fluids down.     · You have signs of a blood clot in your leg (called a deep vein thrombosis), such as:  ? Pain in your calf, back of the knee, thigh, or groin. ? Redness and swelling in your leg or groin.     · You cannot pass stools or gas.     · Bright red blood has soaked through the bandage over your incision.     · You have loose stitches, or your incision comes open.     · You have signs of infection, such as:  ? Increased pain, swelling, warmth, or redness. ? Red streaks leading from the incision. ? Pus draining from the incision. ? A fever.    Watch closely for any changes in your health, and be sure to contact your doctor if you have any problems. Where can you learn more? Go to http://albina-samuel.info/. Enter X846 in the search box to learn more about \"Hernia Repair: What to Expect at Home. \"  Current as of: March 28, 2018  Content Version: 11.8  © 4241-9810 [employer]. Care instructions adapted under license by [employer] (which disclaims liability or warranty for this information). If you have questions about a medical condition or this instruction, always ask your healthcare professional. [address] any warranty or liability for your use of this information.

## 2018-12-11 NOTE — DISCHARGE SUMMARY
Discharge Summary    Patient: Dahlia Malone MRN: [id number]  CSN: [id number]    YOB: 1980  Age: 43 y.o. Sex: female    DOA: 11/11/2018 LOS:  LOS: 3 days   Discharge Date: 11/14/2018     Admission Diagnoses: Incarcerated Inguinal Hernia  Incarcerated inguinal hernia    Discharge Diagnoses:    Problem List as of 11/14/2018 Date Reviewed: 9/29/2016          Codes Class Noted - Resolved    Incarcerated inguinal hernia ICD-10-CM: K40.30  ICD-9-CM: 550.10  11/11/2018 - Present        Hemangioma ICD-10-CM: D18.00  ICD-9-CM: 228.00  11/16/2016 - Present        Hyperlipidemia ICD-10-CM: E78.5  ICD-9-CM: 272.4  11/9/2016 - Present        Hematuria, microscopic ICD-10-CM: R31.29  ICD-9-CM: 599.72  9/19/2016 - Present    Overview Addendum 4/1/2018 11:08 PM by Chris Jon MD     Dahlia Malone is a 38 y.o. female who presents today for mod Microscopic hematuria In smoker ,  Chronic pelvic pain  , neg GYN w/u   Cytology neg   9/16   CT urogram  9/16  NUTS   Cytology neg   3/17   Cytology  Neg  3/18              Acquired absence of both cervix and uterus ICD-10-CM: Z90.710  ICD-9-CM: V88.01  8/19/2016 - Present        History of anemia ICD-10-CM: Z86.2  ICD-9-CM: V12.3  8/19/2016 - Present        History of asthma ICD-10-CM: Z87.09  ICD-9-CM: V12.69  8/19/2016 - Present        Migraine ICD-10-CM: R81.956  ICD-9-CM: 346.90  8/19/2016 - Present        Migraine headache ICD-10-CM: W23.531  ICD-9-CM: 346.90  8/19/2016 - Present        Mixed anxiety depressive disorder ICD-10-CM: F41.8  ICD-9-CM: 300.4  8/19/2016 - Present        Rib pain ICD-10-CM: R07.81  ICD-9-CM: 786.50  8/19/2016 - Present        Seizure ([address] ICD-10-CM: R56.9  ICD-9-CM: 780.39  8/19/2016 - Present              Discharge Condition: Good    Hospital Course: Underwent repair of incarcerated LIH.     Consults: None    Significant Diagnostic Studies: see chart    Discharge Medications:   Cannot display discharge medications since this patient is not currently admitted.       Activity: Activity as tolerated and no driving for today    Diet: Regular Diet    Wound Care: None needed    Follow-up: 1week

## 2019-06-11 ENCOUNTER — HOSPITAL ENCOUNTER (EMERGENCY)
Age: 39
Discharge: HOME | End: 2019-06-11
Attending: EMERGENCY MEDICINE
Payer: MEDICAID

## 2019-06-11 ENCOUNTER — APPOINTMENT (OUTPATIENT)
Dept: CT IMAGING | Age: 39
End: 2019-06-11
Attending: EMERGENCY MEDICINE
Payer: MEDICAID

## 2019-06-11 VITALS
TEMPERATURE: 98.3 F | BODY MASS INDEX: 21.85 KG/M2 | DIASTOLIC BLOOD PRESSURE: 65 MMHG | HEART RATE: 63 BPM | SYSTOLIC BLOOD PRESSURE: 88 MMHG | RESPIRATION RATE: 18 BRPM | WEIGHT: 128 LBS | OXYGEN SATURATION: 100 % | HEIGHT: 64 IN

## 2019-06-11 DIAGNOSIS — F17.210 CIGARETTE NICOTINE DEPENDENCE WITHOUT COMPLICATION: Primary | ICD-10-CM

## 2019-06-11 DIAGNOSIS — R10.31 RLQ ABDOMINAL PAIN: ICD-10-CM

## 2019-06-11 LAB
ALBUMIN SERPL-MCNC: 3.7 G/DL (ref 3.4–5)
ALBUMIN/GLOB SERPL: 1.1 {RATIO} (ref 0.8–1.7)
ALP SERPL-CCNC: 45 U/L (ref 45–117)
ALT SERPL-CCNC: 14 U/L (ref 13–56)
ANION GAP SERPL CALC-SCNC: 7 MMOL/L (ref 3–18)
APPEARANCE UR: CLEAR
AST SERPL-CCNC: 8 U/L (ref 15–37)
BASOPHILS # BLD: 0 K/UL (ref 0–0.1)
BASOPHILS NFR BLD: 0 % (ref 0–2)
BILIRUB SERPL-MCNC: 0.3 MG/DL (ref 0.2–1)
BILIRUB UR QL: NEGATIVE
BUN SERPL-MCNC: 15 MG/DL (ref 7–18)
BUN/CREAT SERPL: 18 (ref 12–20)
CALCIUM SERPL-MCNC: 8.7 MG/DL (ref 8.5–10.1)
CHLORIDE SERPL-SCNC: 111 MMOL/L (ref 100–108)
CO2 SERPL-SCNC: 24 MMOL/L (ref 21–32)
COLOR UR: YELLOW
CREAT SERPL-MCNC: 0.84 MG/DL (ref 0.6–1.3)
DIFFERENTIAL METHOD BLD: ABNORMAL
EOSINOPHIL # BLD: 0.1 K/UL (ref 0–0.4)
EOSINOPHIL NFR BLD: 2 % (ref 0–5)
ERYTHROCYTE [DISTWIDTH] IN BLOOD BY AUTOMATED COUNT: 13.2 % (ref 11.6–14.5)
GLOBULIN SER CALC-MCNC: 3.3 G/DL (ref 2–4)
GLUCOSE SERPL-MCNC: 89 MG/DL (ref 74–99)
GLUCOSE UR STRIP.AUTO-MCNC: NEGATIVE MG/DL
HCG UR QL: NEGATIVE
HCT VFR BLD AUTO: 37.5 % (ref 35–45)
HGB BLD-MCNC: 12.6 G/DL (ref 12–16)
HGB UR QL STRIP: NEGATIVE
KETONES UR QL STRIP.AUTO: NEGATIVE MG/DL
LEUKOCYTE ESTERASE UR QL STRIP.AUTO: NEGATIVE
LIPASE SERPL-CCNC: 119 U/L (ref 73–393)
LYMPHOCYTES # BLD: 2.3 K/UL (ref 0.9–3.6)
LYMPHOCYTES NFR BLD: 36 % (ref 21–52)
MAGNESIUM SERPL-MCNC: 1.9 MG/DL (ref 1.6–2.6)
MCH RBC QN AUTO: 30.9 PG (ref 24–34)
MCHC RBC AUTO-ENTMCNC: 33.6 G/DL (ref 31–37)
MCV RBC AUTO: 91.9 FL (ref 74–97)
MONOCYTES # BLD: 0.5 K/UL (ref 0.05–1.2)
MONOCYTES NFR BLD: 7 % (ref 3–10)
NEUTS SEG # BLD: 3.5 K/UL (ref 1.8–8)
NEUTS SEG NFR BLD: 55 % (ref 40–73)
NITRITE UR QL STRIP.AUTO: NEGATIVE
PH UR STRIP: 7 [PH] (ref 5–8)
PLATELET # BLD AUTO: 202 K/UL (ref 135–420)
PMV BLD AUTO: 9 FL (ref 9.2–11.8)
POTASSIUM SERPL-SCNC: 3.8 MMOL/L (ref 3.5–5.5)
PROT SERPL-MCNC: 7 G/DL (ref 6.4–8.2)
PROT UR STRIP-MCNC: NEGATIVE MG/DL
RBC # BLD AUTO: 4.08 M/UL (ref 4.2–5.3)
SODIUM SERPL-SCNC: 142 MMOL/L (ref 136–145)
SP GR UR REFRACTOMETRY: 1 (ref 1–1.03)
UROBILINOGEN UR QL STRIP.AUTO: 0.2 EU/DL (ref 0.2–1)
WBC # BLD AUTO: 6.4 K/UL (ref 4.6–13.2)

## 2019-06-11 PROCEDURE — 74011636320 HC RX REV CODE- 636/320: Performed by: EMERGENCY MEDICINE

## 2019-06-11 PROCEDURE — 81003 URINALYSIS AUTO W/O SCOPE: CPT

## 2019-06-11 PROCEDURE — 83735 ASSAY OF MAGNESIUM: CPT

## 2019-06-11 PROCEDURE — 83690 ASSAY OF LIPASE: CPT

## 2019-06-11 PROCEDURE — 99284 EMERGENCY DEPT VISIT MOD MDM: CPT

## 2019-06-11 PROCEDURE — 80053 COMPREHEN METABOLIC PANEL: CPT

## 2019-06-11 PROCEDURE — 85025 COMPLETE CBC W/AUTO DIFF WBC: CPT

## 2019-06-11 PROCEDURE — 74177 CT ABD & PELVIS W/CONTRAST: CPT

## 2019-06-11 PROCEDURE — 81025 URINE PREGNANCY TEST: CPT

## 2019-06-11 PROCEDURE — 96374 THER/PROPH/DIAG INJ IV PUSH: CPT

## 2019-06-11 PROCEDURE — 74011250636 HC RX REV CODE- 250/636: Performed by: EMERGENCY MEDICINE

## 2019-06-11 PROCEDURE — 96361 HYDRATE IV INFUSION ADD-ON: CPT

## 2019-06-11 RX ORDER — HYDROCODONE BITARTRATE AND ACETAMINOPHEN 5; 325 MG/1; MG/1
1 TABLET ORAL
Qty: 6 TAB | Refills: 0 | Status: SHIPPED | OUTPATIENT
Start: 2019-06-11 | End: 2019-06-14

## 2019-06-11 RX ORDER — KETOROLAC TROMETHAMINE 15 MG/ML
15 INJECTION, SOLUTION INTRAMUSCULAR; INTRAVENOUS
Status: COMPLETED | OUTPATIENT
Start: 2019-06-11 | End: 2019-06-11

## 2019-06-11 RX ADMIN — IOPAMIDOL 100 ML: 612 INJECTION, SOLUTION INTRAVENOUS at 15:20

## 2019-06-11 RX ADMIN — KETOROLAC TROMETHAMINE 15 MG: 15 INJECTION, SOLUTION INTRAMUSCULAR; INTRAVENOUS at 14:58

## 2019-06-11 RX ADMIN — SODIUM CHLORIDE 1000 ML: 900 INJECTION, SOLUTION INTRAVENOUS at 14:02

## 2019-06-11 NOTE — DISCHARGE INSTRUCTIONS
You were seen and evaluated in the Emergency Department. Please understand that your work up is not all encompassing and you should follow up with your primary care physician for further management and continuity of care. Please return to Emergency Department or seek medical attention immediately if you have acute worsening in your symptoms or develop chest pain, shortness of breath, repeated vomiting, fever, altered level of consciousness, coughing up blood, or start sweating and feel clammy. If you were prescribed any medicine for home, please take as prescribed by your health-care provider. If you were given any follow-up appointments or numbers to call, please do so as instructed. Avoid any tobacco products or excessive alcohol. Patient Education        Abdominal Pain: Care Instructions  Your Care Instructions    Abdominal pain has many possible causes. Some aren't serious and get better on their own in a few days. Others need more testing and treatment. If your pain continues or gets worse, you need to be rechecked and may need more tests to find out what is wrong. You may need surgery to correct the problem. Don't ignore new symptoms, such as fever, nausea and vomiting, urination problems, pain that gets worse, and dizziness. These may be signs of a more serious problem. Your doctor may have recommended a follow-up visit in the next 8 to 12 hours. If you are not getting better, you may need more tests or treatment. The doctor has checked you carefully, but problems can develop later. If you notice any problems or new symptoms, get medical treatment right away. Follow-up care is a key part of your treatment and safety. Be sure to make and go to all appointments, and call your doctor if you are having problems. It's also a good idea to know your test results and keep a list of the medicines you take. How can you care for yourself at home? · Rest until you feel better.   · To prevent dehydration, drink plenty of fluids, enough so that your urine is light yellow or clear like water. Choose water and other caffeine-free clear liquids until you feel better. If you have kidney, heart, or liver disease and have to limit fluids, talk with your doctor before you increase the amount of fluids you drink. · If your stomach is upset, eat mild foods, such as rice, dry toast or crackers, bananas, and applesauce. Try eating several small meals instead of two or three large ones. · Wait until 48 hours after all symptoms have gone away before you have spicy foods, alcohol, and drinks that contain caffeine. · Do not eat foods that are high in fat. · Avoid anti-inflammatory medicines such as aspirin, ibuprofen (Advil, Motrin), and naproxen (Aleve). These can cause stomach upset. Talk to your doctor if you take daily aspirin for another health problem. When should you call for help? Call 911 anytime you think you may need emergency care. For example, call if:    · You passed out (lost consciousness).     · You pass maroon or very bloody stools.     · You vomit blood or what looks like coffee grounds.     · You have new, severe belly pain.    Call your doctor now or seek immediate medical care if:    · Your pain gets worse, especially if it becomes focused in one area of your belly.     · You have a new or higher fever.     · Your stools are black and look like tar, or they have streaks of blood.     · You have unexpected vaginal bleeding.     · You have symptoms of a urinary tract infection. These may include:  ? Pain when you urinate. ? Urinating more often than usual.  ? Blood in your urine.     · You are dizzy or lightheaded, or you feel like you may faint.    Watch closely for changes in your health, and be sure to contact your doctor if:    · You are not getting better after 1 day (24 hours). Where can you learn more? Go to http://albina-samuel.info/.   Enter F441 in the search box to learn more about \"Abdominal Pain: Care Instructions. \"  Current as of: September 23, 2018  Content Version: 11.9  © 5140-4858 [employer]. Care instructions adapted under license by [employer] (which disclaims liability or warranty for this information). If you have questions about a medical condition or this instruction, always ask your healthcare professional. [address] any warranty or liability for your use of this information.

## 2019-06-11 NOTE — ED PROVIDER NOTES
EMERGENCY DEPARTMENT HISTORY AND PHYSICAL EXAM    Date: 6/11/2019  Patient Name: Silver Verduzco    History of Presenting Illness     Chief Complaint   Patient presents with    Abdominal Pain         History Provided By: Patient    Additional History (Context):   Silver Verduzco is a 43 y.o. female with PMHX left-sided inguinal hernia status post repair, hyperlipidemia, migraine headaches, IBS presents to the emergency department C/O intermittent and right lower quadrant abdominal pain worsened since yesterday. States this feels typical of her inguinal hernia. Reports that it does radiate down to her suprapubic region and groin. Patient states that her pain initially started 4 months ago. States that she called her surgeon's office who told her to come to be evaluated in the emergency department today. Patient states that her last movement was this morning. Reports diarrhea. Pt denies nausea, vomiting, dysuria, hematuria, vaginal discharge, vaginal bleeding and any other sxs or complaints. Reports appendectomy in the 90s. PCP: Yesenia Goode MD    Current Outpatient Medications   Medication Sig Dispense Refill    HYDROcodone-acetaminophen (NORCO) 5-325 mg per tablet Take 1 Tab by mouth every six (6) hours as needed for Pain for up to 3 days. Max Daily Amount: 4 Tabs. 6 Tab 0    ALPRAZolam (XANAX) 0.5 mg tablet TAKE 1 TABLET BY MOUTH DAILY AS NEEDED FOR ANXIETY  5    topiramate (TOPAMAX) 200 mg tablet TAKE 1 TABLET BY ORAL ROUTE 2 TIMES EVERY DAY  5    ibuprofen (MOTRIN) 600 mg tablet 600 mg.      FLUoxetine (PROZAC) 40 mg capsule Take 40 mg by mouth.       VENTOLIN HFA 90 mcg/actuation inhaler INHALE 2 PUFFS BY MOUTH EVERY 4-6 HOURS AS NEEDED FOR CHEST TIGHTNESS,COUGH,WHEEZE OR ASTHMA  3    ziprasidone (GEODON) 40 mg capsule TAKE ONE CAPSULE BY MOUTH TWICE A DAY WITH FOOD  5       Past History     Past Medical History:  Past Medical History:   Diagnosis Date    Asthma     Back pain     Hematuria, microscopic 9/19/2016    Shilpa Aguirre is a 38 y.o. female who presents today for mod Microscopic hematuria In smoker ,  Chronic pelvic pain  , neg GYN w/u  Cytology neg   9/16  CT urogram  9/16  NUTS     Other ill-defined conditions(799.89)     emphysema    Other ill-defined conditions(799.89)     hemangioma    Sciatica     Smoker        Past Surgical History:  Past Surgical History:   Procedure Laterality Date    HX APPENDECTOMY      HX CARPAL TUNNEL RELEASE      HX CYST REMOVAL      HX GYN      2 ectopic pregnancies    HX HERNIA REPAIR  2019    HX HYSTERECTOMY         Family History:  History reviewed. No pertinent family history. Social History:  Social History     Tobacco Use    Smoking status: Current Every Day Smoker     Years: 1.00     Types: Cigarettes    Smokeless tobacco: Never Used   Substance Use Topics    Alcohol use: Yes     Comment: occassionally    Drug use: No       Allergies: Allergies   Allergen Reactions    Latex Hives    Sumatriptan Other (comments)     Pt states \"it made my headache so much worse\" Asked by Dr Marion Stover to add it to her allergy list    Imitrex [Sumatriptan Succinate] Other (comments)    Lamictal [Lamotrigine] Other (comments)    Zoloft [Sertraline] Other (comments)         Review of Systems   Review of Systems   Constitutional: Negative for chills and fever. HENT: Negative for congestion, ear pain, sinus pain and sore throat. Eyes: Negative for pain and visual disturbance. Respiratory: Negative for cough and shortness of breath. Cardiovascular: Negative for chest pain and leg swelling. Gastrointestinal: Positive for abdominal pain and diarrhea. Negative for constipation, nausea and vomiting. Genitourinary: Negative for dysuria, hematuria, vaginal bleeding and vaginal discharge. Musculoskeletal: Negative for back pain and neck pain. Skin: Negative for rash and wound.    Neurological: Negative for dizziness, tremors, weakness, light-headedness and numbness. All other systems reviewed and are negative.       Physical Exam     Vitals:    06/11/19 1328   BP: 107/70   Pulse: 60   Resp: 14   Temp: 98.3 °F (36.8 °C)   SpO2: 100%   Weight: 58.1 kg (128 lb)   Height: 5' 4\" (1.626 m)     Physical Exam    Nursing note and vitals reviewed    Constitutional: Young  female, no acute distress  Head: Normocephalic, Atraumatic  Eyes: Pupils are equal, round, and reactive to light, EOMI  Neck: Supple, non-tender  Cardiovascular: Regular rate and rhythm, no murmurs, rubs, or gallops  Chest: Normal work of breathing and chest excursion bilaterally  Lungs: Clear to ausculation bilaterally, no wheezes, no rhonchi  Abdomen: Soft, mild right lower quadrant tenderness with no rebound or guarding, non distended, normoactive bowel sounds, no palpable masses  Back: No evidence of trauma or deformity  Extremities: No evidence of trauma or deformity, no LE edema  Skin: Warm and dry, normal cap refill  Neuro: Alert and appropriate, CN intact, normal speech, moving all 4 extremities freely and symmetrically  Psychiatric: Normal mood and affect       Diagnostic Study Results     Labs -     Recent Results (from the past 12 hour(s))   URINALYSIS W/ RFLX MICROSCOPIC    Collection Time: 06/11/19  1:30 PM   Result Value Ref Range    Color YELLOW      Appearance CLEAR      Specific gravity 1.005 1.005 - 1.030      pH (UA) 7.0 5.0 - 8.0      Protein NEGATIVE  NEG mg/dL    Glucose NEGATIVE  NEG mg/dL    Ketone NEGATIVE  NEG mg/dL    Bilirubin NEGATIVE  NEG      Blood NEGATIVE  NEG      Urobilinogen 0.2 0.2 - 1.0 EU/dL    Nitrites NEGATIVE  NEG      Leukocyte Esterase NEGATIVE  NEG     HCG URINE, QL. - POC    Collection Time: 06/11/19  1:45 PM   Result Value Ref Range    Pregnancy test,urine (POC) NEGATIVE  NEG     CBC WITH AUTOMATED DIFF    Collection Time: 06/11/19  1:55 PM   Result Value Ref Range    WBC 6.4 4.6 - 13.2 K/uL    RBC 4.08 (L) 4.20 - 5.30 M/uL    HGB 12.6 12.0 - 16.0 g/dL    HCT 37.5 35.0 - 45.0 %    MCV 91.9 74.0 - 97.0 FL    MCH 30.9 24.0 - 34.0 PG    MCHC 33.6 31.0 - 37.0 g/dL    RDW 13.2 11.6 - 14.5 %    PLATELET 024 393 - 818 K/uL    MPV 9.0 (L) 9.2 - 11.8 FL    NEUTROPHILS 55 40 - 73 %    LYMPHOCYTES 36 21 - 52 %    MONOCYTES 7 3 - 10 %    EOSINOPHILS 2 0 - 5 %    BASOPHILS 0 0 - 2 %    ABS. NEUTROPHILS 3.5 1.8 - 8.0 K/UL    ABS. LYMPHOCYTES 2.3 0.9 - 3.6 K/UL    ABS. MONOCYTES 0.5 0.05 - 1.2 K/UL    ABS. EOSINOPHILS 0.1 0.0 - 0.4 K/UL    ABS. BASOPHILS 0.0 0.0 - 0.1 K/UL    DF AUTOMATED     METABOLIC PANEL, COMPREHENSIVE    Collection Time: 06/11/19  1:55 PM   Result Value Ref Range    Sodium 142 136 - 145 mmol/L    Potassium 3.8 3.5 - 5.5 mmol/L    Chloride 111 (H) 100 - 108 mmol/L    CO2 24 21 - 32 mmol/L    Anion gap 7 3.0 - 18 mmol/L    Glucose 89 74 - 99 mg/dL    BUN 15 7.0 - 18 MG/DL    Creatinine 0.84 0.6 - 1.3 MG/DL    BUN/Creatinine ratio 18 12 - 20      GFR est AA >60 >60 ml/min/1.73m2    GFR est non-AA >60 >60 ml/min/1.73m2    Calcium 8.7 8.5 - 10.1 MG/DL    Bilirubin, total 0.3 0.2 - 1.0 MG/DL    ALT (SGPT) 14 13 - 56 U/L    AST (SGOT) 8 (L) 15 - 37 U/L    Alk. phosphatase 45 45 - 117 U/L    Protein, total 7.0 6.4 - 8.2 g/dL    Albumin 3.7 3.4 - 5.0 g/dL    Globulin 3.3 2.0 - 4.0 g/dL    A-G Ratio 1.1 0.8 - 1.7     MAGNESIUM    Collection Time: 06/11/19  1:55 PM   Result Value Ref Range    Magnesium 1.9 1.6 - 2.6 mg/dL   LIPASE    Collection Time: 06/11/19  1:55 PM   Result Value Ref Range    Lipase 119 73 - 393 U/L       Radiologic Studies -   CT ABD PELV W CONT   Final Result   IMPRESSION:      1. No acute intra-abdominal or pelvic abnormality demonstrated. 2. Normal caliber small and large bowel. Prior appendectomy. CT Results  (Last 48 hours)               06/11/19 1529  CT ABD PELV W CONT Final result    Impression:  IMPRESSION:       1. No acute intra-abdominal or pelvic abnormality demonstrated. 2. Normal caliber small and large bowel. Prior appendectomy. Narrative:  EXAM: CT of the abdomen and pelvis       INDICATION: 76-year-old patient with right lower quadrant abdominal pain       COMPARISON: CT scan performed 11/12/2018       TECHNIQUE: Axial CT imaging of the abdomen and pelvis was performed without oral   and with intravenous contrast. Multiplanar reformats were generated. One or more dose reduction techniques were used on this CT: automated exposure   control, adjustment of the mAs and/or kVp according to patient size, and   iterative reconstruction techniques. The specific techniques used on this CT   exam have been documented in the patient's electronic medical record. Digital   Imaging and Communications in Medicine (DICOM) format image data are available   to nonaffiliated external healthcare facilities or entities on a secure, media   free, reciprocally searchable basis with patient authorization for at least a   12-month period after this study. _______________       FINDINGS:       LOWER CHEST: Minor dependent atelectasis noted at the lung bases bilaterally. Cardiac size normal. No pericardial effusion. LIVER, BILIARY: Liver is normal. No biliary dilation. Gallbladder is   unremarkable. PANCREAS: Normal.       SPLEEN: Normal.       ADRENALS: Normal.       KIDNEYS/URETERS/BLADDER: Symmetric renal enhancement without hydronephrosis or   urolithiasis. No discrete renal lesion. Urinary bladder normal in CT appearance. PELVIC ORGANS: Uterus is surgically absent. Low attenuating left adnexal   structure measures approximately 2.3 x 2.4 cm in size. VASCULATURE: Unremarkable       LYMPH NODES: No enlarged lymph nodes. GASTROINTESTINAL TRACT: No bowel dilation or wall thickening. Prior   appendectomy. No bowel obstruction. No free intraperitoneal gas. BONES: No acute or aggressive osseous abnormalities identified.        OTHER: None.       _______________               CXR Results  (Last 48 hours)    None            Medical Decision Making   I am the first provider for this patient. I reviewed the vital signs, available nursing notes, past medical history, past surgical history, family history and social history. Vital Signs-Reviewed the patient's vital signs. Pulse Oximetry Analysis -100 % on room air    Records Reviewed: Nursing Notes and Old Medical Records    Provider Notes:   43 y.o. female presenting with right lower quadrant abdominal pain intermittently over the last 4 months, worsened over the last day. On exam, patient is afebrile with appropriate vital signs. She does not appear acutely ill or in distress. Abdominal exam shows no palpable masses. Mild right lower quadrant tenderness with no rebound or guarding. Will obtain labs and a CT scan to evaluate for incarcerated hernia although low on my differential.    Procedures:  Procedures    ED Course:   1:42 PM   Initial assessment performed. The patients presenting problems have been discussed, and they are in agreement with the care plan formulated and outlined with them. I have encouraged them to ask questions as they arise throughout their visit. SMOKING CESSATION:  The patient was counseled on the dangers of tobacco use, and was advised to quit. Reviewed strategies to maximize success, including removing cigarettes and smoking materials from environment. Discussion took 3-5 minutes, and pt expressed understanding. ED Course as of Jun 11 1618   Tue Jun 11, 2019   1604 4:04 PM CT scan showing no acute abnormality. Discussed patient's history, exam, and available diagnostics results with [address] surgeon, who do not recommend emergent surgical intervention      [CA]      ED Course User Index  [CA] Memo Oropeza DO     4:10 PM  CBC showing no leukocytosis or bandemia. CMP showing no metabolic derangements. CT scan showing no acute abnormality.   After discussion with the patient in regards to labs, CT, that Dr. Daily Almanzar does not recommend emergent surgical intervention, patient became agitated. Explained to the patient there is no indication for any antibiotics, emergent surgical evaluation. Patient continues to be agitated, stating \"they sent me here so Dr. Daily Almanzar could evaluate me\". After an in-depth conversation with the patient explaining that there is no indication for surgical evaluation with normal CT scan and normal labs, patient states \"you have said that over and over again, I am not retarded, but I am still having pain\". She states that \"I get Toradol for migraines and it does nothing\". States that she has a follow-up appointment with Dr. Cuong Andrade next week however states \"I cannot be in this much pain that long\". States \"I am going to [place] for make-up and hair next week for my wedding and I will just go to [facility] because they are good hospital\". Patient provided a disk of her CT scan. Diagnosis and Disposition       DISCHARGE NOTE:  4:10 PM    Dayan Haley  results have been reviewed with her. She has been counseled regarding her diagnosis, treatment, and plan. She verbally conveys understanding and agreement of the signs, symptoms, diagnosis, treatment and prognosis and additionally agrees to follow up as discussed. She also agrees with the care-plan and conveys that all of her questions have been answered. I have also provided discharge instructions for her that include: educational information regarding their diagnosis and treatment, and list of reasons why they would want to return to the ED prior to their follow-up appointment, should her condition change. She has been provided with education for proper emergency department utilization. CLINICAL IMPRESSION:    1. Cigarette nicotine dependence without complication    2. RLQ abdominal pain        PLAN:  1. D/C Home  2.    Current Discharge Medication List      START taking these medications Details   HYDROcodone-acetaminophen (NORCO) 5-325 mg per tablet Take 1 Tab by mouth every six (6) hours as needed for Pain for up to 3 days. Max Daily Amount: 4 Tabs. Qty: 6 Tab, Refills: 0    Associated Diagnoses: RLQ abdominal pain           3. Follow-up Information     Follow up With Specialties Details Why Contact Info    Claudia Rojas MD Family Practice Schedule an appointment as soon as possible for a visit in 2 days  [address] [facility]      Checo Pineda MD General Surgery Schedule an appointment as soon as possible for a visit in 2 days  [address] [facility] [address] [place] [address] DEPT Emergency Medicine  As needed if symptoms worsen [address] [address]  [phone]        ____________________________________     Please note that this dictation was completed with [employer], the computer voice recognition software. Quite often unanticipated grammatical, syntax, homophones, and other interpretive errors are inadvertently transcribed by the computer software. Please disregard these errors. Please excuse any errors that have escaped final proofreading.

## 2019-06-11 NOTE — ED TRIAGE NOTES
Patient reports has been having pain to right lower side of abdomen. Patient has hx of hernia on left side and reports called Dr. Dillan Gaston who told her to come to ED.

## 2019-10-02 ENCOUNTER — HOSPITAL ENCOUNTER (OUTPATIENT)
Dept: PREADMISSION TESTING | Age: 39
Discharge: HOME | End: 2019-10-02
Payer: OTHER GOVERNMENT

## 2019-10-02 LAB
HCG SERPL QL: NEGATIVE
HCT VFR BLD AUTO: 36.7 % (ref 35–45)
HGB BLD-MCNC: 12.2 G/DL (ref 12–16)

## 2019-10-02 PROCEDURE — 36415 COLL VENOUS BLD VENIPUNCTURE: CPT

## 2019-10-02 PROCEDURE — 84703 CHORIONIC GONADOTROPIN ASSAY: CPT

## 2019-10-02 PROCEDURE — 85018 HEMOGLOBIN: CPT

## 2019-10-23 ENCOUNTER — ANESTHESIA (OUTPATIENT)
Dept: SURGERY | Age: 39
End: 2019-10-23
Payer: OTHER GOVERNMENT

## 2019-10-23 ENCOUNTER — HOSPITAL ENCOUNTER (OUTPATIENT)
Age: 39
Setting detail: OUTPATIENT SURGERY
Discharge: HOME | End: 2019-10-23
Attending: SURGERY | Admitting: SURGERY
Payer: OTHER GOVERNMENT

## 2019-10-23 ENCOUNTER — ANESTHESIA EVENT (OUTPATIENT)
Dept: SURGERY | Age: 39
End: 2019-10-23
Payer: OTHER GOVERNMENT

## 2019-10-23 VITALS
WEIGHT: 142.25 LBS | HEART RATE: 81 BPM | OXYGEN SATURATION: 98 % | TEMPERATURE: 97.7 F | SYSTOLIC BLOOD PRESSURE: 108 MMHG | BODY MASS INDEX: 24.28 KG/M2 | HEIGHT: 64 IN | DIASTOLIC BLOOD PRESSURE: 65 MMHG | RESPIRATION RATE: 16 BRPM

## 2019-10-23 DIAGNOSIS — K40.30 INCARCERATED INGUINAL HERNIA: Primary | ICD-10-CM

## 2019-10-23 PROCEDURE — 77030008603 HC TRCR ENDOSC EPATH J&J -C: Performed by: SURGERY

## 2019-10-23 PROCEDURE — 76210000021 HC REC RM PH II 0.5 TO 1 HR: Performed by: SURGERY

## 2019-10-23 PROCEDURE — 74011250636 HC RX REV CODE- 250/636: Performed by: SURGERY

## 2019-10-23 PROCEDURE — C1781 MESH (IMPLANTABLE): HCPCS | Performed by: SURGERY

## 2019-10-23 PROCEDURE — 77030002933 HC SUT MCRYL J&J -A: Performed by: SURGERY

## 2019-10-23 PROCEDURE — 77030016151 HC PROTCTR LNS DFOG COVD -B: Performed by: SURGERY

## 2019-10-23 PROCEDURE — 77030018842 HC SOL IRR SOD CL 9% BAXT -A: Performed by: SURGERY

## 2019-10-23 PROCEDURE — 88305 TISSUE EXAM BY PATHOLOGIST: CPT

## 2019-10-23 PROCEDURE — 74011000250 HC RX REV CODE- 250: Performed by: SURGERY

## 2019-10-23 PROCEDURE — 74011250636 HC RX REV CODE- 250/636: Performed by: NURSE ANESTHETIST, CERTIFIED REGISTERED

## 2019-10-23 PROCEDURE — 77030022704 HC SUT VLOC COVD -B: Performed by: SURGERY

## 2019-10-23 PROCEDURE — 74011250636 HC RX REV CODE- 250/636: Performed by: ANESTHESIOLOGY

## 2019-10-23 PROCEDURE — 77030031492 HC PRT ACC BLNT AIRSEAL CNMD -B: Performed by: SURGERY

## 2019-10-23 PROCEDURE — 77030010507 HC ADH SKN DERMBND J&J -B: Performed by: SURGERY

## 2019-10-23 PROCEDURE — 74011000258 HC RX REV CODE- 258: Performed by: SURGERY

## 2019-10-23 PROCEDURE — 74011250637 HC RX REV CODE- 250/637: Performed by: NURSE ANESTHETIST, CERTIFIED REGISTERED

## 2019-10-23 PROCEDURE — 88302 TISSUE EXAM BY PATHOLOGIST: CPT

## 2019-10-23 PROCEDURE — 76010000875 HC OR TIME 1.5 TO 2HR INTENSV - TIER 2: Performed by: SURGERY

## 2019-10-23 PROCEDURE — 74011000250 HC RX REV CODE- 250: Performed by: NURSE ANESTHETIST, CERTIFIED REGISTERED

## 2019-10-23 PROCEDURE — 76060000034 HC ANESTHESIA 1.5 TO 2 HR: Performed by: SURGERY

## 2019-10-23 PROCEDURE — 76210000006 HC OR PH I REC 0.5 TO 1 HR: Performed by: SURGERY

## 2019-10-23 PROCEDURE — 77030035277 HC OBTRTR BLDELSS DISP INTU -B: Performed by: SURGERY

## 2019-10-23 PROCEDURE — 77030020782 HC GWN BAIR PAWS FLX 3M -B: Performed by: SURGERY

## 2019-10-23 PROCEDURE — 77010033678 HC OXYGEN DAILY

## 2019-10-23 PROCEDURE — 74011250637 HC RX REV CODE- 250/637: Performed by: ANESTHESIOLOGY

## 2019-10-23 PROCEDURE — 77030003578 HC NDL INSUF VERES AMR -B: Performed by: SURGERY

## 2019-10-23 PROCEDURE — 77030040361 HC SLV COMPR DVT MDII -B: Performed by: SURGERY

## 2019-10-23 DEVICE — LAPAROSCOPIC SELF-FIXATING MESH POLYESTER WITH POLYLACTIC ACID GRIPS AND COLLAGEN FILM
Type: IMPLANTABLE DEVICE | Site: GROIN | Status: FUNCTIONAL
Brand: PROGRIP

## 2019-10-23 RX ORDER — PROPOFOL 10 MG/ML
INJECTION, EMULSION INTRAVENOUS AS NEEDED
Status: DISCONTINUED | OUTPATIENT
Start: 2019-10-23 | End: 2019-10-23 | Stop reason: HOSPADM

## 2019-10-23 RX ORDER — HYDROMORPHONE HYDROCHLORIDE 2 MG/ML
0.5 INJECTION, SOLUTION INTRAMUSCULAR; INTRAVENOUS; SUBCUTANEOUS
Status: DISCONTINUED | OUTPATIENT
Start: 2019-10-23 | End: 2019-10-23 | Stop reason: HOSPADM

## 2019-10-23 RX ORDER — FENTANYL CITRATE 50 UG/ML
25 INJECTION, SOLUTION INTRAMUSCULAR; INTRAVENOUS AS NEEDED
Status: DISCONTINUED | OUTPATIENT
Start: 2019-10-23 | End: 2019-10-23 | Stop reason: HOSPADM

## 2019-10-23 RX ORDER — SODIUM CHLORIDE 0.9 % (FLUSH) 0.9 %
5-40 SYRINGE (ML) INJECTION AS NEEDED
Status: DISCONTINUED | OUTPATIENT
Start: 2019-10-23 | End: 2019-10-23 | Stop reason: HOSPADM

## 2019-10-23 RX ORDER — MAGNESIUM SULFATE 100 %
4 CRYSTALS MISCELLANEOUS AS NEEDED
Status: DISCONTINUED | OUTPATIENT
Start: 2019-10-23 | End: 2019-10-23 | Stop reason: HOSPADM

## 2019-10-23 RX ORDER — KETOROLAC TROMETHAMINE 30 MG/ML
INJECTION, SOLUTION INTRAMUSCULAR; INTRAVENOUS AS NEEDED
Status: DISCONTINUED | OUTPATIENT
Start: 2019-10-23 | End: 2019-10-23 | Stop reason: HOSPADM

## 2019-10-23 RX ORDER — ALBUTEROL SULFATE 90 UG/1
2 AEROSOL, METERED RESPIRATORY (INHALATION) ONCE
Status: COMPLETED | OUTPATIENT
Start: 2019-10-23 | End: 2019-10-23

## 2019-10-23 RX ORDER — NALOXONE HYDROCHLORIDE 0.4 MG/ML
0.1 INJECTION, SOLUTION INTRAMUSCULAR; INTRAVENOUS; SUBCUTANEOUS AS NEEDED
Status: DISCONTINUED | OUTPATIENT
Start: 2019-10-23 | End: 2019-10-23 | Stop reason: HOSPADM

## 2019-10-23 RX ORDER — SODIUM CHLORIDE, SODIUM LACTATE, POTASSIUM CHLORIDE, CALCIUM CHLORIDE 600; 310; 30; 20 MG/100ML; MG/100ML; MG/100ML; MG/100ML
1000 INJECTION, SOLUTION INTRAVENOUS CONTINUOUS
Status: DISCONTINUED | OUTPATIENT
Start: 2019-10-23 | End: 2019-10-23 | Stop reason: HOSPADM

## 2019-10-23 RX ORDER — OXYCODONE AND ACETAMINOPHEN 5; 325 MG/1; MG/1
1 TABLET ORAL
Qty: 20 TAB | Refills: 0 | Status: SHIPPED | OUTPATIENT
Start: 2019-10-23 | End: 2019-10-28

## 2019-10-23 RX ORDER — MIDAZOLAM HYDROCHLORIDE 1 MG/ML
INJECTION, SOLUTION INTRAMUSCULAR; INTRAVENOUS AS NEEDED
Status: DISCONTINUED | OUTPATIENT
Start: 2019-10-23 | End: 2019-10-23 | Stop reason: HOSPADM

## 2019-10-23 RX ORDER — FLUMAZENIL 0.1 MG/ML
0.2 INJECTION INTRAVENOUS
Status: DISCONTINUED | OUTPATIENT
Start: 2019-10-23 | End: 2019-10-23 | Stop reason: HOSPADM

## 2019-10-23 RX ORDER — FENTANYL CITRATE 50 UG/ML
INJECTION, SOLUTION INTRAMUSCULAR; INTRAVENOUS AS NEEDED
Status: DISCONTINUED | OUTPATIENT
Start: 2019-10-23 | End: 2019-10-23 | Stop reason: HOSPADM

## 2019-10-23 RX ORDER — NEOSTIGMINE METHYLSULFATE 1 MG/ML
INJECTION, SOLUTION INTRAVENOUS AS NEEDED
Status: DISCONTINUED | OUTPATIENT
Start: 2019-10-23 | End: 2019-10-23 | Stop reason: HOSPADM

## 2019-10-23 RX ORDER — HYDROMORPHONE HYDROCHLORIDE 2 MG/ML
INJECTION, SOLUTION INTRAMUSCULAR; INTRAVENOUS; SUBCUTANEOUS AS NEEDED
Status: DISCONTINUED | OUTPATIENT
Start: 2019-10-23 | End: 2019-10-23 | Stop reason: HOSPADM

## 2019-10-23 RX ORDER — SODIUM CHLORIDE, SODIUM LACTATE, POTASSIUM CHLORIDE, CALCIUM CHLORIDE 600; 310; 30; 20 MG/100ML; MG/100ML; MG/100ML; MG/100ML
125 INJECTION, SOLUTION INTRAVENOUS CONTINUOUS
Status: DISCONTINUED | OUTPATIENT
Start: 2019-10-23 | End: 2019-10-23 | Stop reason: HOSPADM

## 2019-10-23 RX ORDER — BUPIVACAINE HYDROCHLORIDE 2.5 MG/ML
INJECTION, SOLUTION EPIDURAL; INFILTRATION; INTRACAUDAL AS NEEDED
Status: DISCONTINUED | OUTPATIENT
Start: 2019-10-23 | End: 2019-10-23 | Stop reason: HOSPADM

## 2019-10-23 RX ORDER — DEXTROSE MONOHYDRATE 100 MG/ML
125-250 INJECTION, SOLUTION INTRAVENOUS AS NEEDED
Status: DISCONTINUED | OUTPATIENT
Start: 2019-10-23 | End: 2019-10-23 | Stop reason: HOSPADM

## 2019-10-23 RX ORDER — ROCURONIUM BROMIDE 10 MG/ML
INJECTION, SOLUTION INTRAVENOUS AS NEEDED
Status: DISCONTINUED | OUTPATIENT
Start: 2019-10-23 | End: 2019-10-23 | Stop reason: HOSPADM

## 2019-10-23 RX ORDER — METOCLOPRAMIDE HYDROCHLORIDE 5 MG/ML
INJECTION INTRAMUSCULAR; INTRAVENOUS AS NEEDED
Status: DISCONTINUED | OUTPATIENT
Start: 2019-10-23 | End: 2019-10-23 | Stop reason: HOSPADM

## 2019-10-23 RX ORDER — ONDANSETRON 2 MG/ML
INJECTION INTRAMUSCULAR; INTRAVENOUS AS NEEDED
Status: DISCONTINUED | OUTPATIENT
Start: 2019-10-23 | End: 2019-10-23 | Stop reason: HOSPADM

## 2019-10-23 RX ORDER — GLYCOPYRROLATE 0.2 MG/ML
INJECTION INTRAMUSCULAR; INTRAVENOUS AS NEEDED
Status: DISCONTINUED | OUTPATIENT
Start: 2019-10-23 | End: 2019-10-23 | Stop reason: HOSPADM

## 2019-10-23 RX ORDER — BUTALBITAL, ACETAMINOPHEN AND CAFFEINE 300; 40; 50 MG/1; MG/1; MG/1
1 CAPSULE ORAL
COMMUNITY

## 2019-10-23 RX ORDER — LIDOCAINE HYDROCHLORIDE 20 MG/ML
INJECTION, SOLUTION EPIDURAL; INFILTRATION; INTRACAUDAL; PERINEURAL AS NEEDED
Status: DISCONTINUED | OUTPATIENT
Start: 2019-10-23 | End: 2019-10-23 | Stop reason: HOSPADM

## 2019-10-23 RX ORDER — OXYCODONE HYDROCHLORIDE 5 MG/1
5 TABLET ORAL ONCE
Status: COMPLETED | OUTPATIENT
Start: 2019-10-23 | End: 2019-10-23

## 2019-10-23 RX ORDER — EPHEDRINE SULFATE/0.9% NACL/PF 50 MG/5 ML
SYRINGE (ML) INTRAVENOUS AS NEEDED
Status: DISCONTINUED | OUTPATIENT
Start: 2019-10-23 | End: 2019-10-23 | Stop reason: HOSPADM

## 2019-10-23 RX ORDER — SCOLOPAMINE TRANSDERMAL SYSTEM 1 MG/1
1 PATCH, EXTENDED RELEASE TRANSDERMAL ONCE
Status: DISCONTINUED | OUTPATIENT
Start: 2019-10-23 | End: 2019-10-23

## 2019-10-23 RX ORDER — ALBUTEROL SULFATE 90 UG/1
AEROSOL, METERED RESPIRATORY (INHALATION) AS NEEDED
Status: DISCONTINUED | OUTPATIENT
Start: 2019-10-23 | End: 2019-10-23 | Stop reason: HOSPADM

## 2019-10-23 RX ORDER — DEXAMETHASONE SODIUM PHOSPHATE 4 MG/ML
INJECTION, SOLUTION INTRA-ARTICULAR; INTRALESIONAL; INTRAMUSCULAR; INTRAVENOUS; SOFT TISSUE AS NEEDED
Status: DISCONTINUED | OUTPATIENT
Start: 2019-10-23 | End: 2019-10-23 | Stop reason: HOSPADM

## 2019-10-23 RX ORDER — SODIUM CHLORIDE 0.9 % (FLUSH) 0.9 %
5-40 SYRINGE (ML) INJECTION EVERY 8 HOURS
Status: DISCONTINUED | OUTPATIENT
Start: 2019-10-23 | End: 2019-10-23 | Stop reason: HOSPADM

## 2019-10-23 RX ADMIN — Medication 10 MG: at 08:15

## 2019-10-23 RX ADMIN — HYDROMORPHONE HYDROCHLORIDE 0.5 MG: 2 INJECTION, SOLUTION INTRAMUSCULAR; INTRAVENOUS; SUBCUTANEOUS at 08:25

## 2019-10-23 RX ADMIN — Medication 10 MG: at 08:10

## 2019-10-23 RX ADMIN — KETOROLAC TROMETHAMINE 30 MG: 30 INJECTION, SOLUTION INTRAMUSCULAR; INTRAVENOUS at 09:02

## 2019-10-23 RX ADMIN — DEXAMETHASONE SODIUM PHOSPHATE 4 MG: 4 INJECTION, SOLUTION INTRAMUSCULAR; INTRAVENOUS at 08:11

## 2019-10-23 RX ADMIN — HYDROMORPHONE HYDROCHLORIDE 0.5 MG: 2 INJECTION INTRAMUSCULAR; INTRAVENOUS; SUBCUTANEOUS at 10:02

## 2019-10-23 RX ADMIN — LIDOCAINE HYDROCHLORIDE 60 MG: 20 INJECTION, SOLUTION EPIDURAL; INFILTRATION; INTRACAUDAL; PERINEURAL at 07:59

## 2019-10-23 RX ADMIN — OXYCODONE HYDROCHLORIDE 5 MG: 5 TABLET ORAL at 11:04

## 2019-10-23 RX ADMIN — METOCLOPRAMIDE 10 MG: 5 INJECTION, SOLUTION INTRAMUSCULAR; INTRAVENOUS at 08:11

## 2019-10-23 RX ADMIN — Medication 2 MG: at 09:06

## 2019-10-23 RX ADMIN — FENTANYL CITRATE 75 MCG: 50 INJECTION, SOLUTION INTRAMUSCULAR; INTRAVENOUS at 07:58

## 2019-10-23 RX ADMIN — ALBUTEROL SULFATE 2 PUFF: 90 AEROSOL, METERED RESPIRATORY (INHALATION) at 07:50

## 2019-10-23 RX ADMIN — SODIUM CHLORIDE, SODIUM LACTATE, POTASSIUM CHLORIDE, AND CALCIUM CHLORIDE: 600; 310; 30; 20 INJECTION, SOLUTION INTRAVENOUS at 08:36

## 2019-10-23 RX ADMIN — HYDROMORPHONE HYDROCHLORIDE 0.5 MG: 2 INJECTION, SOLUTION INTRAMUSCULAR; INTRAVENOUS; SUBCUTANEOUS at 08:36

## 2019-10-23 RX ADMIN — CEFOXITIN SODIUM 2 G: 2 POWDER, FOR SOLUTION INTRAVENOUS at 08:03

## 2019-10-23 RX ADMIN — SODIUM CHLORIDE, SODIUM LACTATE, POTASSIUM CHLORIDE, AND CALCIUM CHLORIDE 125 ML/HR: 600; 310; 30; 20 INJECTION, SOLUTION INTRAVENOUS at 06:53

## 2019-10-23 RX ADMIN — ONDANSETRON HYDROCHLORIDE 4 MG: 2 INJECTION INTRAMUSCULAR; INTRAVENOUS at 09:01

## 2019-10-23 RX ADMIN — PROPOFOL 150 MG: 10 INJECTION, EMULSION INTRAVENOUS at 07:59

## 2019-10-23 RX ADMIN — Medication 50 MG: at 07:59

## 2019-10-23 RX ADMIN — HYDROMORPHONE HYDROCHLORIDE 0.5 MG: 2 INJECTION INTRAMUSCULAR; INTRAVENOUS; SUBCUTANEOUS at 09:52

## 2019-10-23 RX ADMIN — SODIUM CHLORIDE, SODIUM LACTATE, POTASSIUM CHLORIDE, AND CALCIUM CHLORIDE 1000 ML: 600; 310; 30; 20 INJECTION, SOLUTION INTRAVENOUS at 09:08

## 2019-10-23 RX ADMIN — FENTANYL CITRATE 25 MCG: 50 INJECTION, SOLUTION INTRAMUSCULAR; INTRAVENOUS at 08:22

## 2019-10-23 RX ADMIN — MIDAZOLAM 2 MG: 1 INJECTION INTRAMUSCULAR; INTRAVENOUS at 07:53

## 2019-10-23 RX ADMIN — GLYCOPYRROLATE 0.4 MG: 0.2 INJECTION INTRAMUSCULAR; INTRAVENOUS at 09:06

## 2019-10-23 RX ADMIN — Medication 20 MG: at 08:29

## 2019-10-23 NOTE — DISCHARGE INSTRUCTIONS
Post-Operative Discharge Instructions  Wyatt Ayala. Tammi Squires M.D.  [address] [place] [address] Evy Mckeon  [phone]    Patient: Jose Saldivar MRN: [id number]  CSN: [id number]    YOB: 1980  Age: 35 y.o. Sex: female    DOA: 10/23/2019 LOS:  LOS: 0 days   Discharge Date:      Acute Diagnoses:  RIGHT INGUINAL HERNIA    Chronic Medical Diagnoses:  Problem List as of 10/23/2019 Date Reviewed: 10/23/2019          Codes Class Noted - Resolved    Incarcerated inguinal hernia ICD-10-CM: K40.30  ICD-9-CM: 550.10  11/11/2018 - Present        Hemangioma ICD-10-CM: D18.00  ICD-9-CM: 228.00  11/16/2016 - Present        Hyperlipidemia ICD-10-CM: E78.5  ICD-9-CM: 272.4  11/9/2016 - Present        Hematuria, microscopic ICD-10-CM: R31.29  ICD-9-CM: 599.72  9/19/2016 - Present    Overview Addendum 4/2/2019 11:12 AM by Joseph Ray MD     Minesh Randall is a 38 y.o. female who presents today for mod Microscopic hematuria In smoker ,  Chronic pelvic pain  , neg GYN w/u   Cytology neg   9/16   CT urogram  9/16  NUTS   Cytology neg   3/17   Cytology  Neg  3/18  Cytology  Neg 3/19               Acquired absence of both cervix and uterus ICD-10-CM: Z90.710  ICD-9-CM: V88.01  8/19/2016 - Present        History of anemia ICD-10-CM: Z86.2  ICD-9-CM: V12.3  8/19/2016 - Present        History of asthma ICD-10-CM: Z87.09  ICD-9-CM: V12.69  8/19/2016 - Present        Migraine ICD-10-CM: U61.905  ICD-9-CM: 346.90  8/19/2016 - Present        Migraine headache ICD-10-CM: Q49.429  ICD-9-CM: 346.90  8/19/2016 - Present        Mixed anxiety depressive disorder ICD-10-CM: F41.8  ICD-9-CM: 300.4  8/19/2016 - Present        Rib pain ICD-10-CM: R07.81  ICD-9-CM: 786.50  8/19/2016 - Present        Seizure ([facility] [address] ICD-10-CM: R56.9  ICD-9-CM: 780.39  8/19/2016 - Present              Diet  1. Resume prior to surgery diet as tolerated. Activity  1.  Do not drive a car or operate any hazardous machinery the day of surgery. 2. Rest quietly today. 3. No bending or heavy lifting. 4. You may resume other prior to surgery activities as tolerated. 5. You may remove the bandage and shower in 1 day. Drain / Wound Care  1. Follow all drain / wound care instructions exactly as explained by the Nurse at time of discharge. 2. Apply an ice pack to the surgical site for 48 hours. 3. Do not put any salves or ointments on the wound. Allow it to form a dry scab. 4. Leave steri-strips / Dermabond alone. They should be allowed to fall off on their own in 7-14 days. Medications  1. It is important to take your medications exactly as they are prescribed. 2. Keep your medication in the bottles provided by the pharmacist, and keep a list of the medication names, dosages, and times they should be taken in your wallet. Call 911 anytime you think you may need emergency care. For example, call if:  · You passed out (lost consciousness). · You have severe trouble breathing. · You have sudden chest pain and shortness of breath. Notify your Surgeon for any of the followin. Fever, chills, nausea, vomiting, severe abdominal pain or bleeding. 2. If you experience any redness or discharge or sign of infection. 3. Persistent nausea lasting more than 24 hours. If you are unable to reach your Surgeon for any of the symptoms above, you should proceed directly to the nearest Emergency Department. Post-Operative Appointment Information    Call Dr. Derrell Gomez office tomorrow morning at [phone] to schedule a post-operative office visit in one (1) week. If any questions or concerns arise, call your Surgeon at [phone].     DISCHARGE SUMMARY from Nurse    PATIENT INSTRUCTIONS:    After general anesthesia or intravenous sedation, for 24 hours or while taking prescription Narcotics:  · Limit your activities  · Do not drive and operate hazardous machinery  · Do not make important personal or business decisions  · Do  not drink alcoholic beverages  · If you have not urinated within 8 hours after discharge, please contact your surgeon on call. Report the following to your surgeon:  · Excessive pain, swelling, redness or odor of or around the surgical area  · Temperature over 100.5  · Nausea and vomiting lasting longer than 4 hours or if unable to take medications  · Any signs of decreased circulation or nerve impairment to extremity: change in color, persistent  numbness, tingling, coldness or increase pain  · Any questions    What to do at Home:  Recommended activity: Activity as tolerated and no driving for today,     If you experience any of the following symptoms as per above, please follow up with Dr. Christie Fuentes. *  Please give a list of your current medications to your Primary Care Provider. *  Please update this list whenever your medications are discontinued, doses are      changed, or new medications (including over-the-counter products) are added. *  Please carry medication information at all times in case of emergency situations. These are general instructions for a healthy lifestyle:    No smoking/ No tobacco products/ Avoid exposure to second hand smoke  Surgeon General's Warning:  Quitting smoking now greatly reduces serious risk to your health. Obesity, smoking, and sedentary lifestyle greatly increases your risk for illness    A healthy diet, regular physical exercise & weight monitoring are important for maintaining a healthy lifestyle    You may be retaining fluid if you have a history of heart failure or if you experience any of the following symptoms:  Weight gain of 3 pounds or more overnight or 5 pounds in a week, increased swelling in our hands or feet or shortness of breath while lying flat in bed. Please call your doctor as soon as you notice any of these symptoms; do not wait until your next office visit.   Patient armband removed and shredded      The discharge information has been reviewed with the patient and caregiver. The patient and caregiver verbalized understanding. Discharge medications reviewed with the patient and caregiver and appropriate educational materials and side effects teaching were provided.   ___________________________________________________________________________________________________________________________________

## 2019-10-23 NOTE — BRIEF OP NOTE
BRIEF OPERATIVE NOTE    Date of Procedure: 10/23/2019   Preoperative Diagnosis: RIGHT INGUINAL HERNIA  Postoperative Diagnosis: RIGHT INGUINAL HERNIA    Procedure(s):  ROBOTIC RIGHT INGUINAL  HERNIA REPAIR  Surgeon(s) and Role:     * Susanne Khan MD - Primary         Surgical Assistant: none    Surgical Staff:  Circ-1: Taryn Arndt RN  Scrub Tech-1: Mary Lou Ros  Scrub Tech-Relief: Art Coventrzaina  Surg Asst-1: Star Stage  Event Time In Time Out   Incision Start 0820    Incision Close 0916      Anesthesia: General   Estimated Blood Loss: min  Specimens:   ID Type Source Tests Collected by Time Destination   1 : HERNIA CONTENTS Preservative Abdomen  Ash Dailey MD 10/23/2019 8868 Pathology   2 : LYMPHNODE Preservative Lymph Node  Ash Dailey MD 10/23/2019 0904 Pathology      Findings: RIH   Complications: none  Implants:   Implant Name Type Inv.  Item Serial No.  Lot No. LRB No. Used Action   MESH ABSORB SURG PROGRIP 10X15 -- PROGRIP - [id number]  MESH ABSORB SURG PROGRIP 10X15 -- PROGRIP  [employer]  SURGICAL [id number] Right 1 Implanted

## 2019-10-23 NOTE — ANESTHESIA POSTPROCEDURE EVALUATION
Procedure(s):  ROBOTIC RIGHT INGUINAL  HERNIA REPAIR. general    Anesthesia Post Evaluation        Comments: Post-Anesthesia Evaluation and Assessment    Cardiovascular Function/Vital Signs  /61   Pulse 64   Temp 36.4 °C (97.5 °F)   Resp 11   Ht 5' 4\" (1.626 m)   Wt 64.5 kg (142 lb 4 oz)   SpO2 97%   BMI 24.42 kg/m²     Patient is status post Procedure(s):  ROBOTIC RIGHT INGUINAL  HERNIA REPAIR. Nausea/Vomiting: Controlled. Postoperative hydration reviewed and adequate. Pain:  Pain Scale 1: FLACC (10/23/19 1009)  Pain Intensity 1: 0 (10/23/19 1009)   Managed. Neurological Status:   Neuro (WDL): Exceptions to WDL (10/23/19 9755)   At baseline. Mental Status and Level of Consciousness: Arousable. Pulmonary Status:   O2 Device: Room air (10/23/19 9281)   Adequate oxygenation and airway patent. Complications related to anesthesia: None    Post-anesthesia assessment completed. No concerns. Patient has met all discharge requirements. Signed By: Rosalie Kauffman MD    October 23, 2019                   Vitals Value Taken Time   /61 10/23/2019 10:10 AM   Temp 36.4 °C (97.5 °F) 10/23/2019  9:28 AM   Pulse 76 10/23/2019 10:11 AM   Resp 10 10/23/2019 10:11 AM   SpO2 96 % 10/23/2019 10:11 AM   Vitals shown include unvalidated device data.

## 2019-10-23 NOTE — H&P
History & Physical    Patient: Della Bautista MRN: [id number]  CSN: [id number]    YOB: 1980  Age: 35 y.o. Sex: female      DOA: 10/23/2019       HPI:     Della Bautista is a 35 y.o. female who presents for a RIH repair. Past Medical History:   Diagnosis Date    Asthma     Back pain     Hematuria, microscopic 9/19/2016    Lucia Encinas is a 38 y.o. female who presents today for mod Microscopic hematuria In smoker ,  Chronic pelvic pain  , neg GYN w/u  Cytology neg   9/16  CT urogram  9/16  NUTS     Other ill-defined conditions(799.89)     emphysema ,patient denies     Other ill-defined conditions(799.89)     hemangioma ,venous malformation face    Psychiatric disorder     anxiety depression    Sciatica     Seizures ([facility] [address]     last episode 2 yrs ago    Smoker        Past Surgical History:   Procedure Laterality Date    HX APPENDECTOMY      HX CARPAL TUNNEL RELEASE      HX CYST REMOVAL      HX GYN      1 ectopic pregnancies    HX HERNIA REPAIR  2019    HX HYSTERECTOMY      HX OTHER SURGICAL      laser surgery face     HX OTHER SURGICAL      plastic surgery face       History reviewed. No pertinent family history. Social History     Socioeconomic History    Marital status:      Spouse name: Not on file    Number of children: Not on file    Years of education: Not on file    Highest education level: Not on file   Tobacco Use    Smoking status: Current Every Day Smoker     Packs/day: 0.50     Years: 29.00     Pack years: 14.50     Types: Cigarettes    Smokeless tobacco: Never Used    Tobacco comment: instructed not to smoke 24 hrs prior surgery   Substance and Sexual Activity    Alcohol use: Yes     Comment: occassionally    Drug use: No    Sexual activity: Yes   Social History Narrative    ** Merged History Encounter **            Prior to Admission medications    Medication Sig Start Date End Date Taking?  Authorizing Provider butalbital-acetaminophen-caff (FIORICET) -40 mg per capsule Take 1 Cap by mouth every four (4) hours as needed for Pain. Yes Provider, Historical   topiramate (TOPAMAX) 200 mg tablet 200 mg two (2) times a day. siezures 3/9/18  Yes Provider, Historical   vitamin E (AQUA GEMS) 400 unit capsule Take 400 Units by mouth daily. Provider, Historical   cholecalciferol (VITAMIN D3) 1,000 unit cap Take 1,000 Units by mouth daily. Provider, Historical   naproxen (NAPROSYN) 375 mg tablet Take 375 mg by mouth as needed. Provider, Historical   ALPRAZolam (XANAX) 0.5 mg tablet TAKE 1 TABLET BY MOUTH DAILY AS NEEDED FOR ANXIETY 3/8/18   Provider, Historical   VENTOLIN HFA 90 mcg/actuation inhaler INHALE 2 PUFFS BY MOUTH EVERY 4-6 HOURS AS NEEDED FOR CHEST TIGHTNESS,COUGH,WHEEZE OR ASTHMA 3/11/18   Provider, Historical       Allergies   Allergen Reactions    Latex Hives    Sumatriptan Other (comments)     Pt states \"it made my headache so much worse\" Asked by Dr Iona Walker to add it to her allergy list    Imitrex [Sumatriptan Succinate] Other (comments)    Lamictal [Lamotrigine] Other (comments)    Zoloft [Sertraline] Other (comments)       Review of Systems  A comprehensive review of systems was negative except for that written in the History of Present Illness. Physical Exam:      Visit Vitals  BP 97/64 (BP 1 Location: Left arm, BP Patient Position: At rest;Pre-activity; Sitting)   Pulse 64   Temp 98.7 °F (37.1 °C)   Resp 16   Ht 5' 4\" (1.626 m)   Wt 64.5 kg (142 lb 4 oz)   SpO2 100%   BMI 24.42 kg/m²       Physical Exam:  Physical Exam:   General:  Alert, cooperative, no distress, appears stated age. Eyes:  Conjunctivae/corneas clear. PERRL, EOMs intact. Fundi benign   Ears:  Normal TMs and external ear canals both ears. Nose: Nares normal. Septum midline. Mucosa normal. No drainage or sinus tenderness.    Mouth/Throat: Lips, mucosa, and tongue normal. Teeth and gums normal.   Neck: Supple, symmetrical, trachea midline, no adenopathy, thyroid: no enlargement/tenderness/nodules, no carotid bruit and no JVD. Back:   Symmetric, no curvature. ROM normal. No CVA tenderness. Lungs:   Clear to auscultation bilaterally. Heart:  Regular rate and rhythm, S1, S2 normal, no murmur, click, rub or gallop. Abdomen:   Soft, non-tender. Bowel sounds normal. No masses,  No organomegaly. RIH   Extremities: Extremities normal, atraumatic, no cyanosis or edema. Pulses: 2+ and symmetric all extremities. Skin: Skin color, texture, turgor normal. No rashes or lesions   Lymph nodes: Cervical, supraclavicular, and axillary nodes normal.   Neurologic: CNII-XII intact. Normal strength, sensation and reflexes throughout. Labs Reviewed: All lab results for the last 24 hours reviewed. Assessment/Plan     Active Problems:    * No active hospital problems. *      Robotic RIH repair. Discussed risks.

## 2019-10-23 NOTE — PERIOP NOTES
Reviewed PTA medication list with patient/caregiver and patient/caregiver denies any additional medications. Patient admits to having a responsible adult care for them for at least 24 hours after surgery.     Dual skin assessment completed by Andrea Weiss RN and Vida Fontanez RN.

## 2019-10-23 NOTE — PROGRESS NOTES
conducted a pre-surgery visit with Bahman Membreno, who is a 35 y.o.,female. The  provided the following Interventions:  Initiated a relationship of care and support. Offered prayer and assurance of continued prayers on patient's behalf. Plan:  Chaplains will continue to follow and will provide pastoral care on an as needed/requested basis.  recommends bedside caregivers page  on duty if patient shows signs of acute spiritual or emotional distress.       [facility]   [phone]

## 2019-10-23 NOTE — ANESTHESIA PREPROCEDURE EVALUATION
Relevant Problems   No relevant active problems       Anesthetic History               Review of Systems / Medical History  Patient summary reviewed, nursing notes reviewed and pertinent labs reviewed    Pulmonary          Smoker  Asthma        Neuro/Psych     seizures: well controlled    Psychiatric history     Cardiovascular                  Exercise tolerance: >4 METS     GI/Hepatic/Renal  Within defined limits              Endo/Other  Within defined limits           Other Findings              Physical Exam    Airway  Mallampati: II  TM Distance: 4 - 6 cm  Neck ROM: normal range of motion   Mouth opening: Normal     Cardiovascular    Rhythm: regular  Rate: normal         Dental    Dentition: Caps/crowns     Pulmonary  Breath sounds clear to auscultation               Abdominal  GI exam deferred       Other Findings            Anesthetic Plan    ASA: 2  Anesthesia type: general          Induction: Intravenous  Anesthetic plan and risks discussed with: Patient

## 2019-10-29 NOTE — OP NOTES
[facility]  OPERATIVE REPORT    Name:  Pasquale Zapata  MR#:   [id number]  :  1980  ACCOUNT #:  [de-identified]  DATE OF SERVICE:  10/23/2019    PREOPERATIVE DIAGNOSIS:  Right inguinal hernia. POSTOPERATIVE DIAGNOSIS:  Right inguinal hernia. PROCEDURE PERFORMED:  Laparoscopic-assisted repair of right inguinal hernia. SURGEON:  Ria Nuñez MD    ASSISTANT:  None. ANESTHESIA:  General endotracheal.    COMPLICATIONS:  None. SPECIMENS REMOVED:  None. IMPLANTS:  None. ESTIMATED BLOOD LOSS:  Minimal.    INDICATIONS:  A 79-year-old patient who presents with symptomatic right inguinal hernia. She will undergo a repair. PROCEDURE:  She was placed in supine position. Her abdomen was prepped and draped in the usual fashion. She was then placed in lithotomy. A Veress needle was inserted in the left upper quadrant using one-drop technique and pneumoperitoneum was established. After this was done, a 5-mm Optiview trocar was placed in the supraumbilical position and then dilated to a 12-mm port after visualization. An 8-mm AirSeal port was placed in the epigastrium as a working port. Two 8-mm robotic ports were placed; one in the right upper quadrant and one in the left upper quadrant under visualization. Robot was docked in routine fashion. The abdomen was inspected. The patient was found to have a right inguinal hernia. The peritoneal flap was taken down on the right side using the monopolar scissors and the patient was found to have an indirect defect. There was also a slight direct defect. Hernia sacs were taken down along with any lipomatous tissue and all critical views and spaces were visualized and confirmed. The round ligament was transected. Once the hernia sac was taken down as far inferior as possible, a laparoscopic ProGrip mesh was introduced, placed over the inguinal floor, and allowed to self-.   The peritoneal flap was then closed using a 2-0 PDS V-Loc continuous suture. There was good hemostasis, and no enterotomies were seen or made. All ports were removed. The 12-mm trocar site was closed with a 0 Vicryl suture at the fascia, and all skin incisions were closed with 4-0 Monocryl subcuticular closure and Dermabond. The patient tolerated the procedure well.       Cally Bal MD      [id number]  D:  10/28/2019 9:00  T:  10/28/2019 22:05  JOB #:  [id number]

## 2020-11-16 ENCOUNTER — HOSPITAL ENCOUNTER (OUTPATIENT)
Dept: LAB | Age: 40
Discharge: HOME | End: 2020-11-16
Payer: OTHER GOVERNMENT

## 2020-11-16 PROCEDURE — 88305 TISSUE EXAM BY PATHOLOGIST: CPT

## (undated) DEVICE — 3-0 COATED VICRYL PLUS UNDYED 1X27" SH --

## (undated) DEVICE — SOL IRR NACL 0.9% 500ML POUR --

## (undated) DEVICE — LEGGINGS, PAIR, 31X48, STERILE: Brand: MEDLINE

## (undated) DEVICE — OBTRTR BLDELSS 8MM DISP -- DA VINCI - SNGL USE

## (undated) DEVICE — VISUALIZATION SYSTEM: Brand: CLEARIFY

## (undated) DEVICE — TROCAR ENDOSCP SHFT L150MM DIA12MM BLDELSS ENDOPATH XCEL

## (undated) DEVICE — AIRSEAL 8 MM ACCESS PORT AND LOW PROFILE OBTURATOR WITH BLADELESS OPTICAL TIP, 120 MM LENGTH: Brand: AIRSEAL

## (undated) DEVICE — TROCAR LAP L100MM DIA5MM BLDELSS W/ STBL SL ENDOPATH XCEL

## (undated) DEVICE — STERILE POLYISOPRENE POWDER-FREE SURGICAL GLOVES: Brand: PROTEXIS

## (undated) DEVICE — KENDALL SCD EXPRESS SLEEVES, KNEE LENGTH, MEDIUM: Brand: KENDALL SCD

## (undated) DEVICE — SUTURE MCRYL SZ 4-0 L18IN ABSRB UD L19MM PS-2 3/8 CIR PRIM Y496G

## (undated) DEVICE — SUTURE PDS II SZ 2-0 L27IN ABSRB VLT L36MM CT-1 1/2 CIR Z339H

## (undated) DEVICE — LAP CHOLE: Brand: MEDLINE INDUSTRIES, INC.

## (undated) DEVICE — APPLICATOR BNDG 1MM ADH PREMIERPRO EXOFIN

## (undated) DEVICE — PENROSE TUBING RADIOPAQUE: Brand: ARGYLE

## (undated) DEVICE — MINOR: Brand: MEDLINE INDUSTRIES, INC.

## (undated) DEVICE — DERMABOND SKIN ADH 0.7ML -- DERMABOND ADVANCED 12/BX

## (undated) DEVICE — TIP COVER ACCESSORY

## (undated) DEVICE — TRI-LUMEN FILTERED TUBE SET WITH ACTIVATED CHARCOAL FILTER: Brand: AIRSEAL

## (undated) DEVICE — GARMENT,MEDLINE,DVT,INT,CALF,MED, GEN2: Brand: MEDLINE

## (undated) DEVICE — INSUFFLATION NEEDLE TO ESTABLISH PNEUMOPERITONEUM.: Brand: INSUFFLATION NEEDLE

## (undated) DEVICE — STERILE POLYISOPRENE POWDER-FREE SURGICAL GLOVES WITH EMOLLIENT COATING: Brand: PROTEXIS

## (undated) DEVICE — SUT VCRL + 2-0 27IN CT2 UD -- 36/BX

## (undated) DEVICE — KIT DRP 3 ARM ACC DISP ENDOWRIST DA VINCI SI

## (undated) DEVICE — 4-PORT MANIFOLD: Brand: NEPTUNE 2

## (undated) DEVICE — SUTURE DEV SZ 2-0 WND CLSR ABSRB GS-22 VLOC COVIDIEN VLOCM2145

## (undated) DEVICE — SUT MONOCRYL PLUS UD 4-0 --